# Patient Record
Sex: FEMALE | Race: WHITE | NOT HISPANIC OR LATINO | Employment: OTHER | URBAN - METROPOLITAN AREA
[De-identification: names, ages, dates, MRNs, and addresses within clinical notes are randomized per-mention and may not be internally consistent; named-entity substitution may affect disease eponyms.]

---

## 2018-02-26 ENCOUNTER — HISTORICAL (OUTPATIENT)
Dept: ADMINISTRATIVE | Facility: HOSPITAL | Age: 68
End: 2018-02-26

## 2018-04-02 ENCOUNTER — HISTORICAL (OUTPATIENT)
Dept: ADMINISTRATIVE | Facility: HOSPITAL | Age: 68
End: 2018-04-02

## 2022-04-29 NOTE — OP NOTE
Patient:   Michelle Rodríguez             MRN: [id number]            FIN: [id number]               Age:   67 years     Sex:  Female     :  1950   Associated Diagnoses:   None   Author:   Mildred Escalante MD      NAME: Michelle Rodríguez  SURGEON:  Mildred Escalante MD    YOB: 1950  DATE OF SURGERY: 2018    PREOPERATIVE DIAGNOSIS:  Cataract, left eye.    POSTOPERATIVE DIAGNOSIS:  Cataract,left eye.    PROCEDURE:  Cataract extraction with intraocular lens placement,left eye.    HISTORY:  The patient is a 67-year-old female, who presented to the clinic with a 2-3+ nuclear sclerotic cataract causing difficulty in patient's activity of daily living. After thorough discussion of risks, benefits, alternatives and complications, the patient expressed understanding and wished to proceed with cataract extraction.      PROCEDURE IN DETAIL:  On the day of surgery patient was brought to the preoperative holding area, where patient was given five drops each of phenylephrine, tropicamide and Cyclopentolate into the operative eye.  Patient was then brought to the operating room where patient was given Marcaine drops into the operative eye.  Patient was prepped and draped in normal sterile fashion.  A lid speculum was inserted.  Using operating microscope, 1.0-mm keratome was used to create a side port wound through which preservative free lidocaine was injected, followed by air, Vision Blue, BSS, then Viscoat.  A 2.65 mm keratome was used to create triplanar phacoemulsification wound, through which continuous tear capsulorrhexis was performed using Utrata forceps and a cystotome. Hydrodissection and delineation were performed until the lens was freely rotating in the capsular bag.  Phacoemulsification was carried out using divide and conquer fashion to remove epinucleus and nuclear fragments.  Attention was turned to cortex, which was removed using irrigation aspiration.  Healon was injected into the  capsular bag, which was found to be free of tears or defect.  A ZCBOO +20.0 diopter lens was inserted and carefully rotated into place.  Viscoelastic was removed from the eye.  Wounds were sealed using hydration.  Ofloxacin and  Maxitrol ointment were placed into the patient's eye-which was patched and shielded.  The patient tolerated the procedure well and will followup tomorrow in clinic.

## 2022-04-29 NOTE — OP NOTE
Patient:   Michelle Rodríguez             MRN: [id number]            FIN: [id number]               Age:   67 years     Sex:  Female     :  1950   Associated Diagnoses:   None   Author:   Mildred Escalante MD      NAME: Michelle Rodríguez  SURGEON:  Mildred Escalante MD    YOB: 1950  DATE OF SURGERY: 2018    PREOPERATIVE DIAGNOSIS:  Cataract, right eye.    POSTOPERATIVE DIAGNOSIS:  Cataract,right eye.    PROCEDURE:  Cataract extraction with intraocular lens placement,right eye.    HISTORY:  The patient is a 67-year-old female, who presented to the clinic with a 2+ nuclear sclerotic cataract causing difficulty in patient's activity of daily living. After thorough discussion of risks, benefits, alternatives and complications, the patient expressed understanding and wished to proceed with cataract extraction.      PROCEDURE IN DETAIL:  On the day of surgery patient was brought to the preoperative holding area, where patient was given five drops each of phenylephrine, tropicamide and Cyclopentolate into the operative eye.  Patient was then brought to the operating room where patient was given Marcaine drops into the operative eye.  Patient was prepped and draped in normal sterile fashion.  A lid speculum was inserted.  Using operating microscope, 1.0-mm keratome was used to create a side port wound through which preservative free lidocaine was injected, followed by air, Vision Blue, BSS, then Viscoat.  A 2.65 mm keratome was used to create triplanar phacoemulsification wound, through which continuous tear capsulorrhexis was performed using Utrata forceps and a cystotome. Hydrodissection and delineation were performed until the lens was freely rotating in the capsular bag.  Phacoemulsification was carried out using divide and conquer fashion to remove epinucleus and nuclear fragments.  Attention was turned to cortex, which was removed using irrigation aspiration.  Healon was injected into the  capsular bag, which was found to be free of tears or defect.  A ZCBOO +19.50 diopter lens was inserted and carefully rotated into place.  Viscoelastic was removed from the eye.  Wounds were sealed using hydration.  Moxifloxacin and  Maxitrol ointment were placed into the patient's eye-which was patched and shielded.  The patient tolerated the procedure well and will followup tomorrow in clinic.

## 2022-11-21 ENCOUNTER — HOSPITAL ENCOUNTER (OUTPATIENT)
Facility: HOSPITAL | Age: 72
Discharge: HOME | End: 2022-11-21
Attending: INTERNAL MEDICINE | Admitting: INTERNAL MEDICINE
Payer: MEDICARE

## 2022-11-21 VITALS
WEIGHT: 126.13 LBS | DIASTOLIC BLOOD PRESSURE: 54 MMHG | HEART RATE: 58 BPM | HEIGHT: 62 IN | TEMPERATURE: 98 F | BODY MASS INDEX: 23.21 KG/M2 | OXYGEN SATURATION: 98 % | SYSTOLIC BLOOD PRESSURE: 123 MMHG

## 2022-11-21 DIAGNOSIS — R94.8 NONSPECIFIC ABNORMAL RESULTS OF BASAL METABOLISM FUNCTION STUDY: ICD-10-CM

## 2022-11-21 DIAGNOSIS — I42.9 PRIMARY CARDIOMYOPATHY: ICD-10-CM

## 2022-11-21 LAB
CATH EF ESTIMATED: 35 %
POCT GLUCOSE: 109 MG/DL (ref 70–110)

## 2022-11-21 PROCEDURE — 63600175 PHARM REV CODE 636 W HCPCS: Performed by: INTERNAL MEDICINE

## 2022-11-21 PROCEDURE — 99152 MOD SED SAME PHYS/QHP 5/>YRS: CPT | Performed by: INTERNAL MEDICINE

## 2022-11-21 PROCEDURE — C1769 GUIDE WIRE: HCPCS | Performed by: INTERNAL MEDICINE

## 2022-11-21 PROCEDURE — 27201423 OPTIME MED/SURG SUP & DEVICES STERILE SUPPLY: Performed by: INTERNAL MEDICINE

## 2022-11-21 PROCEDURE — C1887 CATHETER, GUIDING: HCPCS | Performed by: INTERNAL MEDICINE

## 2022-11-21 PROCEDURE — 25500020 PHARM REV CODE 255: Performed by: INTERNAL MEDICINE

## 2022-11-21 PROCEDURE — C1894 INTRO/SHEATH, NON-LASER: HCPCS | Performed by: INTERNAL MEDICINE

## 2022-11-21 PROCEDURE — 93458 L HRT ARTERY/VENTRICLE ANGIO: CPT | Performed by: INTERNAL MEDICINE

## 2022-11-21 PROCEDURE — 25000003 PHARM REV CODE 250: Performed by: INTERNAL MEDICINE

## 2022-11-21 RX ORDER — SITAGLIPTIN 100 MG/1
100 TABLET, FILM COATED ORAL DAILY
Status: ON HOLD | COMMUNITY
Start: 2022-11-09 | End: 2023-03-23 | Stop reason: HOSPADM

## 2022-11-21 RX ORDER — DIAZEPAM 5 MG/1
10 TABLET ORAL
Status: DISCONTINUED | OUTPATIENT
Start: 2022-11-21 | End: 2022-11-21 | Stop reason: HOSPADM

## 2022-11-21 RX ORDER — SODIUM CHLORIDE 9 MG/ML
INJECTION, SOLUTION INTRAVENOUS CONTINUOUS
Status: DISCONTINUED | OUTPATIENT
Start: 2022-11-21 | End: 2022-11-21 | Stop reason: HOSPADM

## 2022-11-21 RX ORDER — VIT C/E/ZN/COPPR/LUTEIN/ZEAXAN 250MG-90MG
1000 CAPSULE ORAL DAILY
COMMUNITY

## 2022-11-21 RX ORDER — ASPIRIN 81 MG/1
81 TABLET ORAL DAILY
COMMUNITY

## 2022-11-21 RX ORDER — BISACODYL 5 MG/1
1 TABLET, COATED ORAL DAILY
COMMUNITY

## 2022-11-21 RX ORDER — ACETAMINOPHEN 500 MG
3000 TABLET ORAL DAILY
COMMUNITY

## 2022-11-21 RX ORDER — SODIUM CHLORIDE 0.9 % (FLUSH) 0.9 %
10 SYRINGE (ML) INJECTION
Status: DISCONTINUED | OUTPATIENT
Start: 2022-11-21 | End: 2022-11-21 | Stop reason: HOSPADM

## 2022-11-21 RX ORDER — SACUBITRIL AND VALSARTAN 49; 51 MG/1; MG/1
1 TABLET, FILM COATED ORAL 2 TIMES DAILY
COMMUNITY
Start: 2022-10-28

## 2022-11-21 RX ORDER — METFORMIN HYDROCHLORIDE 1000 MG/1
2000 TABLET ORAL
COMMUNITY
Start: 2022-10-02

## 2022-11-21 RX ORDER — FENTANYL CITRATE 50 UG/ML
INJECTION, SOLUTION INTRAMUSCULAR; INTRAVENOUS
Status: DISCONTINUED | OUTPATIENT
Start: 2022-11-21 | End: 2022-11-21 | Stop reason: HOSPADM

## 2022-11-21 RX ORDER — MIDAZOLAM HYDROCHLORIDE 1 MG/ML
INJECTION INTRAMUSCULAR; INTRAVENOUS
Status: DISCONTINUED | OUTPATIENT
Start: 2022-11-21 | End: 2022-11-21 | Stop reason: HOSPADM

## 2022-11-21 RX ORDER — ALENDRONATE SODIUM 70 MG/1
70 TABLET ORAL
COMMUNITY
Start: 2022-10-25

## 2022-11-21 RX ORDER — CARVEDILOL 25 MG/1
25 TABLET ORAL 2 TIMES DAILY
COMMUNITY
Start: 2022-11-02

## 2022-11-21 RX ORDER — GLIPIZIDE 10 MG/1
10 TABLET, FILM COATED, EXTENDED RELEASE ORAL DAILY
COMMUNITY
Start: 2022-11-15

## 2022-11-21 RX ORDER — HEPARIN SODIUM 1000 [USP'U]/ML
INJECTION, SOLUTION INTRAVENOUS; SUBCUTANEOUS
Status: DISCONTINUED | OUTPATIENT
Start: 2022-11-21 | End: 2022-11-21 | Stop reason: HOSPADM

## 2022-11-21 RX ORDER — ATORVASTATIN CALCIUM 10 MG/1
10 TABLET, FILM COATED ORAL
COMMUNITY
Start: 2022-11-20

## 2022-11-21 RX ORDER — DIPHENHYDRAMINE HCL 25 MG
50 CAPSULE ORAL
Status: DISCONTINUED | OUTPATIENT
Start: 2022-11-21 | End: 2022-11-21 | Stop reason: HOSPADM

## 2022-11-21 RX ORDER — ASCORBIC ACID 500 MG
1 TABLET,CHEWABLE ORAL DAILY
COMMUNITY

## 2022-11-21 RX ORDER — LIDOCAINE HYDROCHLORIDE 20 MG/ML
INJECTION, SOLUTION INFILTRATION; PERINEURAL
Status: DISCONTINUED | OUTPATIENT
Start: 2022-11-21 | End: 2022-11-21 | Stop reason: HOSPADM

## 2022-11-21 RX ORDER — NITROGLYCERIN 20 MG/100ML
INJECTION INTRAVENOUS
Status: DISCONTINUED | OUTPATIENT
Start: 2022-11-21 | End: 2022-11-21 | Stop reason: HOSPADM

## 2022-11-21 RX ORDER — INSULIN GLARGINE 100 [IU]/ML
40 INJECTION, SOLUTION SUBCUTANEOUS DAILY
COMMUNITY
Start: 2022-10-31

## 2022-11-21 RX ORDER — SODIUM CHLORIDE 9 MG/ML
INJECTION, SOLUTION INTRAVENOUS ONCE
Status: COMPLETED | OUTPATIENT
Start: 2022-11-21 | End: 2022-11-21

## 2022-11-21 RX ADMIN — DIPHENHYDRAMINE HYDROCHLORIDE 50 MG: 25 CAPSULE ORAL at 06:11

## 2022-11-21 RX ADMIN — SODIUM CHLORIDE: 9 INJECTION, SOLUTION INTRAVENOUS at 06:11

## 2022-11-21 RX ADMIN — DIAZEPAM 10 MG: 5 TABLET ORAL at 06:11

## 2022-11-21 NOTE — Clinical Note
The catheter was inserted into the, was removed from the and was inserted over the wire into the left ventricle.

## 2022-11-21 NOTE — Clinical Note
The catheter was inserted into the, was removed from the and was inserted over the wire into the ostium   right coronary artery. An angiography was performed of the right coronary arteries. Multiple views were taken.

## 2022-11-21 NOTE — Clinical Note
The DP pulses were 1+ bilaterally. The left radial pulse was 2+. The right radial pulse was +2 and allens test negative.

## 2022-11-21 NOTE — Clinical Note
The catheter was inserted into the, was removed from the and was inserted over the wire into the ostium   left main. An angiography was performed of the left coronary arteries. Multiple views were taken. The angiography was performed via power injection.

## 2022-12-04 NOTE — DISCHARGE SUMMARY
Procedure(s) (LRB):  CATHETERIZATION, HEART, LEFT (Left)    OUTCOME: Patient tolerated treatment/procedure well without complication and is now ready for discharge.    DIAGNOSIS: {Diagnosis :cmo     DISPOSITION: Home or Self Care    FOLLOWUP: In clinic    DISCHARGE INSTRUCTIONS:   Discharge Procedure Orders   Diet general       TIME SPENT ON DISCHARGE:   31 minutes

## 2023-03-23 ENCOUNTER — HOSPITAL ENCOUNTER (OUTPATIENT)
Facility: HOSPITAL | Age: 73
Discharge: HOME | End: 2023-03-23
Attending: INTERNAL MEDICINE | Admitting: INTERNAL MEDICINE
Payer: MEDICARE

## 2023-03-23 VITALS
TEMPERATURE: 98 F | HEART RATE: 71 BPM | DIASTOLIC BLOOD PRESSURE: 70 MMHG | SYSTOLIC BLOOD PRESSURE: 138 MMHG | BODY MASS INDEX: 23.98 KG/M2 | HEIGHT: 62 IN | OXYGEN SATURATION: 95 % | WEIGHT: 130.31 LBS | RESPIRATION RATE: 7 BRPM

## 2023-03-23 DIAGNOSIS — Z01.810 PREOP CARDIOVASCULAR EXAM: ICD-10-CM

## 2023-03-23 DIAGNOSIS — I49.9 ARRHYTHMIA: ICD-10-CM

## 2023-03-23 DIAGNOSIS — I50.42 CHRONIC COMBINED SYSTOLIC AND DIASTOLIC HEART FAILURE: ICD-10-CM

## 2023-03-23 LAB — POCT GLUCOSE: 107 MG/DL (ref 70–110)

## 2023-03-23 PROCEDURE — 33225 L VENTRIC PACING LEAD ADD-ON: CPT | Performed by: INTERNAL MEDICINE

## 2023-03-23 PROCEDURE — C1898 LEAD, PMKR, OTHER THAN TRANS: HCPCS | Performed by: INTERNAL MEDICINE

## 2023-03-23 PROCEDURE — 25000003 PHARM REV CODE 250: Performed by: INTERNAL MEDICINE

## 2023-03-23 PROCEDURE — 93010 EKG 12-LEAD: ICD-10-PCS | Mod: ,,, | Performed by: INTERNAL MEDICINE

## 2023-03-23 PROCEDURE — C1882 AICD, OTHER THAN SING/DUAL: HCPCS | Performed by: INTERNAL MEDICINE

## 2023-03-23 PROCEDURE — 93005 ELECTROCARDIOGRAM TRACING: CPT | Mod: 59

## 2023-03-23 PROCEDURE — 93010 ELECTROCARDIOGRAM REPORT: CPT | Mod: ,,, | Performed by: INTERNAL MEDICINE

## 2023-03-23 PROCEDURE — 33249 INSJ/RPLCMT DEFIB W/LEAD(S): CPT | Performed by: INTERNAL MEDICINE

## 2023-03-23 PROCEDURE — 99153 MOD SED SAME PHYS/QHP EA: CPT | Performed by: INTERNAL MEDICINE

## 2023-03-23 PROCEDURE — C1887 CATHETER, GUIDING: HCPCS | Performed by: INTERNAL MEDICINE

## 2023-03-23 PROCEDURE — C1900 LEAD, CORONARY VENOUS: HCPCS | Performed by: INTERNAL MEDICINE

## 2023-03-23 PROCEDURE — 63600175 PHARM REV CODE 636 W HCPCS: Performed by: INTERNAL MEDICINE

## 2023-03-23 PROCEDURE — 25500020 PHARM REV CODE 255: Performed by: INTERNAL MEDICINE

## 2023-03-23 PROCEDURE — C1777 LEAD, AICD, ENDO SINGLE COIL: HCPCS | Performed by: INTERNAL MEDICINE

## 2023-03-23 PROCEDURE — 99152 MOD SED SAME PHYS/QHP 5/>YRS: CPT | Performed by: INTERNAL MEDICINE

## 2023-03-23 PROCEDURE — C1769 GUIDE WIRE: HCPCS | Performed by: INTERNAL MEDICINE

## 2023-03-23 PROCEDURE — C1893 INTRO/SHEATH, FIXED,NON-PEEL: HCPCS | Performed by: INTERNAL MEDICINE

## 2023-03-23 DEVICE — LEFT HEART LEAD
Type: IMPLANTABLE DEVICE | Site: SUBCLAVIAN | Status: FUNCTIONAL
Brand: QUARTET™

## 2023-03-23 DEVICE — CARDIAC RESYNCHRONISATION THERAPY DEFIBRILLATOR
Type: IMPLANTABLE DEVICE | Site: SUBCLAVIAN | Status: FUNCTIONAL
Brand: GALLANT™

## 2023-03-23 DEVICE — DEFIBRILLATION LEAD
Type: IMPLANTABLE DEVICE | Site: SUBCLAVIAN | Status: FUNCTIONAL
Brand: DURATA™

## 2023-03-23 DEVICE — PACING LEAD
Type: IMPLANTABLE DEVICE | Site: SUBCLAVIAN | Status: FUNCTIONAL
Brand: TENDRIL™

## 2023-03-23 RX ORDER — GLUCAGON 3 MG/1
3 POWDER NASAL
COMMUNITY

## 2023-03-23 RX ORDER — LANOLIN ALCOHOL/MO/W.PET/CERES
400 CREAM (GRAM) TOPICAL DAILY
COMMUNITY

## 2023-03-23 RX ORDER — MORPHINE SULFATE 4 MG/ML
2 INJECTION, SOLUTION INTRAMUSCULAR; INTRAVENOUS EVERY 4 HOURS PRN
Status: DISCONTINUED | OUTPATIENT
Start: 2023-03-23 | End: 2023-03-23 | Stop reason: HOSPADM

## 2023-03-23 RX ORDER — DULAGLUTIDE 0.75 MG/.5ML
0.75 INJECTION, SOLUTION SUBCUTANEOUS
COMMUNITY

## 2023-03-23 RX ORDER — ACETAMINOPHEN 325 MG/1
650 TABLET ORAL EVERY 4 HOURS PRN
Status: DISCONTINUED | OUTPATIENT
Start: 2023-03-23 | End: 2023-03-23 | Stop reason: HOSPADM

## 2023-03-23 RX ORDER — VANCOMYCIN HYDROCHLORIDE 1 G/20ML
INJECTION, POWDER, LYOPHILIZED, FOR SOLUTION INTRAVENOUS
Status: DISCONTINUED | OUTPATIENT
Start: 2023-03-23 | End: 2023-03-23 | Stop reason: HOSPADM

## 2023-03-23 RX ORDER — HYDROCODONE BITARTRATE AND ACETAMINOPHEN 5; 325 MG/1; MG/1
1 TABLET ORAL EVERY 4 HOURS PRN
Status: DISCONTINUED | OUTPATIENT
Start: 2023-03-23 | End: 2023-03-23 | Stop reason: HOSPADM

## 2023-03-23 RX ORDER — SODIUM CHLORIDE 0.9 % (FLUSH) 0.9 %
10 SYRINGE (ML) INJECTION
Status: ACTIVE | OUTPATIENT
Start: 2023-03-23

## 2023-03-23 RX ORDER — LIDOCAINE HYDROCHLORIDE 10 MG/ML
INJECTION INFILTRATION; PERINEURAL
Status: DISCONTINUED | OUTPATIENT
Start: 2023-03-23 | End: 2023-03-23 | Stop reason: HOSPADM

## 2023-03-23 RX ORDER — FENTANYL CITRATE 50 UG/ML
INJECTION, SOLUTION INTRAMUSCULAR; INTRAVENOUS
Status: DISCONTINUED | OUTPATIENT
Start: 2023-03-23 | End: 2023-03-23 | Stop reason: HOSPADM

## 2023-03-23 RX ORDER — MIDAZOLAM HYDROCHLORIDE 1 MG/ML
INJECTION INTRAMUSCULAR; INTRAVENOUS
Status: DISCONTINUED | OUTPATIENT
Start: 2023-03-23 | End: 2023-03-23 | Stop reason: HOSPADM

## 2023-03-23 RX ORDER — CEFAZOLIN SODIUM 1 G/3ML
2 INJECTION, POWDER, FOR SOLUTION INTRAMUSCULAR; INTRAVENOUS
Status: DISPENSED | OUTPATIENT
Start: 2023-03-23

## 2023-03-23 RX ADMIN — HYDROCODONE BITARTRATE AND ACETAMINOPHEN 1 TABLET: 5; 325 TABLET ORAL at 03:03

## 2023-03-23 NOTE — Clinical Note
A venogram was performed in the coronary sinus. The vessel was injected via hand injection  with 10 mL of contrast.

## 2023-03-23 NOTE — DISCHARGE INSTRUCTIONS
Keep your bandage on the site for 24 hours. After that time, it is OK to take it off. You can cover the site with a bandage if you like.  You may shower or take a bath after your dressing is off. Do not scrub the site. Wash gently around it and let the water flow over the site. Pat dry gently. Do not soak in water or swim until your wound is healed.  Always wash your hands before and after touching the wound.  Avoid picking the scab or scratching the site which may cause bleeding. Do not rub the wound or take off any small strips of tape.  Watch for bleeding at the puncture site. If you notice more than a few drops of blood:  Stop what you are doing and sit or lie down.  Put firm pressure on the site with your fingers or with a clean gauze for 10 minutes.  Do not lift up your fingers to check for bleeding or swelling until after 10 minutes.  If the bleeding stops, sit or lie quietly for a few hours, taking care not to move or bend your arm or leg where the bleeding was.  If the bleeding does not stop, or if there is a lot of blood or spurting blood, call for emergency help right away. Do not drive yourself to get help.

## 2023-03-23 NOTE — Clinical Note
The lead was inserted under fluorscopic guidance, thresholds were tested, was connected to generator, was sutured in place and was secured in place. The lead was inserted in the right atrium.

## 2023-03-23 NOTE — Clinical Note
The lead was inserted under fluorscopic guidance, thresholds were tested, was connected to generator, was sutured in place and was secured in place. The lead was inserted in the left ventricle.

## 2023-03-23 NOTE — DISCHARGE SUMMARY
[facility]  Discharge Note  Short Stay    Procedure(s) (LRB):  INSERTION, ICD, BIVENTRICULAR (N/A)      OUTCOME: Patient tolerated treatment/procedure well without complication and is now ready for discharge.    DISPOSITION: Home or Self Care    FINAL DIAGNOSIS:  Chronic combined systolic and diastolic heart failure    FOLLOWUP: In clinic    DISCHARGE INSTRUCTIONS:  No discharge procedures on file.     TIME SPENT ON DISCHARGE: 15 minutes

## 2023-03-23 NOTE — Clinical Note
The lead was inserted under fluorscopic guidance, thresholds were tested, was connected to generator and was sutured in place. A new lead was attached to the coronary sinus.

## 2023-04-03 ENCOUNTER — ANESTHESIA (OUTPATIENT)
Dept: SURGERY | Facility: HOSPITAL | Age: 73
DRG: 167 | End: 2023-04-03
Payer: MEDICARE

## 2023-04-03 ENCOUNTER — HOSPITAL ENCOUNTER (INPATIENT)
Facility: HOSPITAL | Age: 73
LOS: 5 days | Discharge: HOME | DRG: 167 | End: 2023-04-08
Attending: STUDENT IN AN ORGANIZED HEALTH CARE EDUCATION/TRAINING PROGRAM | Admitting: STUDENT IN AN ORGANIZED HEALTH CARE EDUCATION/TRAINING PROGRAM
Payer: MEDICARE

## 2023-04-03 ENCOUNTER — ANESTHESIA EVENT (OUTPATIENT)
Dept: SURGERY | Facility: HOSPITAL | Age: 73
DRG: 167 | End: 2023-04-03
Payer: MEDICARE

## 2023-04-03 DIAGNOSIS — I42.8 NON-ISCHEMIC CARDIOMYOPATHY: ICD-10-CM

## 2023-04-03 DIAGNOSIS — I95.9 HYPOTENSION, UNSPECIFIED HYPOTENSION TYPE: ICD-10-CM

## 2023-04-03 DIAGNOSIS — R07.9 CHEST PAIN: ICD-10-CM

## 2023-04-03 DIAGNOSIS — E86.0 DEHYDRATION: ICD-10-CM

## 2023-04-03 DIAGNOSIS — R11.2 NAUSEA AND VOMITING, UNSPECIFIED VOMITING TYPE: ICD-10-CM

## 2023-04-03 DIAGNOSIS — J94.2 HEMOTHORAX ON LEFT: Primary | ICD-10-CM

## 2023-04-03 LAB
ABO + RH BLD: NORMAL
ABO + RH BLD: NORMAL
ABORH RETYPE: NORMAL
ALBUMIN SERPL-MCNC: 2.9 G/DL (ref 3.4–4.8)
ALBUMIN/GLOB SERPL: 1.5 RATIO (ref 1.1–2)
ALP SERPL-CCNC: 25 UNIT/L (ref 40–150)
ALT SERPL-CCNC: 19 UNIT/L (ref 0–55)
AST SERPL-CCNC: 17 UNIT/L (ref 5–34)
BASOPHILS # BLD AUTO: 0.02 X10(3)/MCL (ref 0–0.2)
BASOPHILS # BLD AUTO: 0.02 X10(3)/MCL (ref 0–0.2)
BASOPHILS NFR BLD AUTO: 0.2 %
BASOPHILS NFR BLD AUTO: 0.3 %
BILIRUBIN DIRECT+TOT PNL SERPL-MCNC: 0.5 MG/DL
BLD PROD TYP BPU: NORMAL
BLD PROD TYP BPU: NORMAL
BLOOD UNIT EXPIRATION DATE: NORMAL
BLOOD UNIT EXPIRATION DATE: NORMAL
BLOOD UNIT TYPE CODE: 600
BLOOD UNIT TYPE CODE: 600
BNP BLD-MCNC: 141.3 PG/ML
BSA FOR ECHO PROCEDURE: 1.58 M2
BUN SERPL-MCNC: 12.8 MG/DL (ref 9.8–20.1)
CALCIUM SERPL-MCNC: 8.5 MG/DL (ref 8.4–10.2)
CHLORIDE SERPL-SCNC: 108 MMOL/L (ref 98–107)
CO2 SERPL-SCNC: 23 MMOL/L (ref 23–31)
CORRECTED TEMPERATURE (PCO2): 41 MMHG (ref 35–45)
CORRECTED TEMPERATURE (PH): 7.41 (ref 7.35–7.45)
CORRECTED TEMPERATURE (PO2): 58 MMHG (ref 80–100)
CREAT SERPL-MCNC: 0.78 MG/DL (ref 0.55–1.02)
CROSSMATCH INTERPRETATION: NORMAL
CROSSMATCH INTERPRETATION: NORMAL
DISPENSE STATUS: NORMAL
DISPENSE STATUS: NORMAL
DOP CALC MV VTI: 15.1 CM
E WAVE DECELERATION TIME: 174 MSEC
E/A RATIO: 0.75
EJECTION FRACTION: 50 %
EOSINOPHIL # BLD AUTO: 0 X10(3)/MCL (ref 0–0.9)
EOSINOPHIL # BLD AUTO: 0.09 X10(3)/MCL (ref 0–0.9)
EOSINOPHIL NFR BLD AUTO: 0 %
EOSINOPHIL NFR BLD AUTO: 1.4 %
ERYTHROCYTE [DISTWIDTH] IN BLOOD BY AUTOMATED COUNT: 13 % (ref 11.5–17)
ERYTHROCYTE [DISTWIDTH] IN BLOOD BY AUTOMATED COUNT: 16.1 % (ref 11.5–17)
FLUAV AG UPPER RESP QL IA.RAPID: NOT DETECTED
FLUBV AG UPPER RESP QL IA.RAPID: NOT DETECTED
GFR SERPLBLD CREATININE-BSD FMLA CKD-EPI: >60 MLS/MIN/1.73/M2
GLOBULIN SER-MCNC: 1.9 GM/DL (ref 2.4–3.5)
GLUCOSE SERPL-MCNC: 397 MG/DL (ref 82–115)
GROUP & RH: NORMAL
HCO3 UR-SCNC: 26 MMOL/L (ref 22–26)
HCT VFR BLD AUTO: 21.3 % (ref 37–47)
HCT VFR BLD AUTO: 25.7 % (ref 37–47)
HGB BLD-MCNC: 7.7 G/DL (ref 12–16)
HGB BLD-MCNC: 8.8 G/DL (ref 12–16)
HGB BLD-MCNC: 9.7 G/DL (ref 12–16)
IMM GRANULOCYTES # BLD AUTO: 0.03 X10(3)/MCL (ref 0–0.04)
IMM GRANULOCYTES # BLD AUTO: 0.04 X10(3)/MCL (ref 0–0.04)
IMM GRANULOCYTES NFR BLD AUTO: 0.4 %
IMM GRANULOCYTES NFR BLD AUTO: 0.5 %
INDIRECT COOMBS GEL: NORMAL
INR BLD: 1.2 (ref 0–1.3)
LIPASE SERPL-CCNC: 17 U/L
LYMPHOCYTES # BLD AUTO: 1.03 X10(3)/MCL (ref 0.6–4.6)
LYMPHOCYTES # BLD AUTO: 1.36 X10(3)/MCL (ref 0.6–4.6)
LYMPHOCYTES NFR BLD AUTO: 10.7 %
LYMPHOCYTES NFR BLD AUTO: 21.5 %
MCH RBC QN AUTO: 32.2 PG (ref 27–31)
MCH RBC QN AUTO: 32.7 PG (ref 27–31)
MCHC RBC AUTO-ENTMCNC: 34.2 G/DL (ref 33–36)
MCHC RBC AUTO-ENTMCNC: 36.2 G/DL (ref 33–36)
MCV RBC AUTO: 89.1 FL (ref 80–94)
MCV RBC AUTO: 95.5 FL (ref 80–94)
MONOCYTES # BLD AUTO: 0.44 X10(3)/MCL (ref 0.1–1.3)
MONOCYTES # BLD AUTO: 0.48 X10(3)/MCL (ref 0.1–1.3)
MONOCYTES NFR BLD AUTO: 4.6 %
MONOCYTES NFR BLD AUTO: 7.6 %
MV MEAN GRADIENT: 2 MMHG
MV PEAK A VEL: 1 M/S
MV PEAK E VEL: 0.75 M/S
MV PEAK GRADIENT: 4 MMHG
NEUTROPHILS # BLD AUTO: 4.34 X10(3)/MCL (ref 2.1–9.2)
NEUTROPHILS # BLD AUTO: 8.09 X10(3)/MCL (ref 2.1–9.2)
NEUTROPHILS NFR BLD AUTO: 68.7 %
NEUTROPHILS NFR BLD AUTO: 84.1 %
NRBC BLD AUTO-RTO: 0 %
NRBC BLD AUTO-RTO: 0 %
PCO2 BLDA: 41 MMHG (ref 35–45)
PH SMN: 7.41 [PH] (ref 7.35–7.45)
PISA TR MAX VEL: 3.24 M/S
PLATELET # BLD AUTO: 114 X10(3)/MCL (ref 130–400)
PLATELET # BLD AUTO: 141 X10(3)/MCL (ref 130–400)
PMV BLD AUTO: 11.1 FL (ref 7.4–10.4)
PMV BLD AUTO: 11.3 FL (ref 7.4–10.4)
PO2 BLDA: 58 MMHG (ref 80–100)
POC BASE DEFICIT: 1.2 MMOL/L (ref -2–2)
POC COHB: 3 %
POC IONIZED CALCIUM: 1.17 MMOL/L (ref 1.12–1.23)
POC METHB: 0.6 % (ref 0.4–1.5)
POC O2HB: 90.2 % (ref 94–97)
POC SATURATED O2: 90 %
POC TEMPERATURE: 37 °C
POCT GLUCOSE: 315 MG/DL (ref 70–110)
POCT GLUCOSE: 383 MG/DL (ref 70–110)
POTASSIUM BLD-SCNC: 4.1 MMOL/L (ref 3.5–5)
POTASSIUM SERPL-SCNC: 5 MMOL/L (ref 3.5–5.1)
PROT SERPL-MCNC: 4.8 GM/DL (ref 5.8–7.6)
PROTHROMBIN TIME: 15 SECONDS (ref 12.5–14.5)
RBC # BLD AUTO: 2.39 X10(6)/MCL (ref 4.2–5.4)
RBC # BLD AUTO: 2.69 X10(6)/MCL (ref 4.2–5.4)
SARS-COV-2 RNA RESP QL NAA+PROBE: NOT DETECTED
SODIUM BLD-SCNC: 133 MMOL/L (ref 137–145)
SODIUM SERPL-SCNC: 136 MMOL/L (ref 136–145)
SPECIMEN OUTDATE: NORMAL
SPECIMEN SOURCE: ABNORMAL
TR MAX PG: 42 MMHG
TRICUSPID ANNULAR PLANE SYSTOLIC EXCURSION: 1.87 CM
TROPONIN I SERPL-MCNC: 0.03 NG/ML (ref 0–0.04)
UNIT NUMBER: NORMAL
UNIT NUMBER: NORMAL
WBC # SPEC AUTO: 6.3 X10(3)/MCL (ref 4.5–11.5)
WBC # SPEC AUTO: 9.6 X10(3)/MCL (ref 4.5–11.5)

## 2023-04-03 PROCEDURE — 93010 EKG 12-LEAD: ICD-10-PCS | Mod: ,,, | Performed by: INTERNAL MEDICINE

## 2023-04-03 PROCEDURE — 11000001 HC ACUTE MED/SURG PRIVATE ROOM

## 2023-04-03 PROCEDURE — 0240U COVID/FLU A&B PCR: CPT | Performed by: STUDENT IN AN ORGANIZED HEALTH CARE EDUCATION/TRAINING PROGRAM

## 2023-04-03 PROCEDURE — 27201423 OPTIME MED/SURG SUP & DEVICES STERILE SUPPLY: Performed by: SPECIALIST

## 2023-04-03 PROCEDURE — 63600175 PHARM REV CODE 636 W HCPCS: Performed by: ANESTHESIOLOGY

## 2023-04-03 PROCEDURE — 37000009 HC ANESTHESIA EA ADD 15 MINS: Performed by: SPECIALIST

## 2023-04-03 PROCEDURE — 82962 GLUCOSE BLOOD TEST: CPT

## 2023-04-03 PROCEDURE — 83880 ASSAY OF NATRIURETIC PEPTIDE: CPT | Performed by: EMERGENCY MEDICINE

## 2023-04-03 PROCEDURE — 84484 ASSAY OF TROPONIN QUANT: CPT | Performed by: EMERGENCY MEDICINE

## 2023-04-03 PROCEDURE — 25000003 PHARM REV CODE 250: Performed by: NURSE ANESTHETIST, CERTIFIED REGISTERED

## 2023-04-03 PROCEDURE — 25500020 PHARM REV CODE 255: Performed by: STUDENT IN AN ORGANIZED HEALTH CARE EDUCATION/TRAINING PROGRAM

## 2023-04-03 PROCEDURE — 25000003 PHARM REV CODE 250: Performed by: STUDENT IN AN ORGANIZED HEALTH CARE EDUCATION/TRAINING PROGRAM

## 2023-04-03 PROCEDURE — C1729 CATH, DRAINAGE: HCPCS | Performed by: SPECIALIST

## 2023-04-03 PROCEDURE — 86923 COMPATIBILITY TEST ELECTRIC: CPT | Mod: 91 | Performed by: STUDENT IN AN ORGANIZED HEALTH CARE EDUCATION/TRAINING PROGRAM

## 2023-04-03 PROCEDURE — 36000711: Performed by: SPECIALIST

## 2023-04-03 PROCEDURE — 63600175 PHARM REV CODE 636 W HCPCS

## 2023-04-03 PROCEDURE — 86923 COMPATIBILITY TEST ELECTRIC: CPT | Performed by: STUDENT IN AN ORGANIZED HEALTH CARE EDUCATION/TRAINING PROGRAM

## 2023-04-03 PROCEDURE — 27000221 HC OXYGEN, UP TO 24 HOURS

## 2023-04-03 PROCEDURE — 82803 BLOOD GASES ANY COMBINATION: CPT

## 2023-04-03 PROCEDURE — 99900035 HC TECH TIME PER 15 MIN (STAT)

## 2023-04-03 PROCEDURE — D9220A PRA ANESTHESIA: ICD-10-PCS | Mod: ANES,,, | Performed by: ANESTHESIOLOGY

## 2023-04-03 PROCEDURE — 93005 ELECTROCARDIOGRAM TRACING: CPT

## 2023-04-03 PROCEDURE — 36000710: Performed by: SPECIALIST

## 2023-04-03 PROCEDURE — 36620 INSERTION CATHETER ARTERY: CPT

## 2023-04-03 PROCEDURE — 99291 CRITICAL CARE FIRST HOUR: CPT

## 2023-04-03 PROCEDURE — 25000003 PHARM REV CODE 250

## 2023-04-03 PROCEDURE — 63600175 PHARM REV CODE 636 W HCPCS: Performed by: STUDENT IN AN ORGANIZED HEALTH CARE EDUCATION/TRAINING PROGRAM

## 2023-04-03 PROCEDURE — 36620 ARTERIAL: ICD-10-PCS | Mod: 59,,, | Performed by: ANESTHESIOLOGY

## 2023-04-03 PROCEDURE — 85025 COMPLETE CBC W/AUTO DIFF WBC: CPT | Performed by: SPECIALIST

## 2023-04-03 PROCEDURE — D9220A PRA ANESTHESIA: Mod: CRNA,,, | Performed by: NURSE ANESTHETIST, CERTIFIED REGISTERED

## 2023-04-03 PROCEDURE — 25000003 PHARM REV CODE 250: Performed by: SPECIALIST

## 2023-04-03 PROCEDURE — 87040 BLOOD CULTURE FOR BACTERIA: CPT | Performed by: STUDENT IN AN ORGANIZED HEALTH CARE EDUCATION/TRAINING PROGRAM

## 2023-04-03 PROCEDURE — D9220A PRA ANESTHESIA: Mod: ANES,,, | Performed by: ANESTHESIOLOGY

## 2023-04-03 PROCEDURE — P9016 RBC LEUKOCYTES REDUCED: HCPCS | Performed by: STUDENT IN AN ORGANIZED HEALTH CARE EDUCATION/TRAINING PROGRAM

## 2023-04-03 PROCEDURE — 80053 COMPREHEN METABOLIC PANEL: CPT | Performed by: EMERGENCY MEDICINE

## 2023-04-03 PROCEDURE — 37000008 HC ANESTHESIA 1ST 15 MINUTES: Performed by: SPECIALIST

## 2023-04-03 PROCEDURE — 85025 COMPLETE CBC W/AUTO DIFF WBC: CPT | Performed by: EMERGENCY MEDICINE

## 2023-04-03 PROCEDURE — 93010 ELECTROCARDIOGRAM REPORT: CPT | Mod: ,,, | Performed by: INTERNAL MEDICINE

## 2023-04-03 PROCEDURE — D9220A PRA ANESTHESIA: ICD-10-PCS | Mod: CRNA,,, | Performed by: NURSE ANESTHETIST, CERTIFIED REGISTERED

## 2023-04-03 PROCEDURE — 86900 BLOOD TYPING SEROLOGIC ABO: CPT | Performed by: EMERGENCY MEDICINE

## 2023-04-03 PROCEDURE — 21400001 HC TELEMETRY ROOM

## 2023-04-03 PROCEDURE — 96367 TX/PROPH/DG ADDL SEQ IV INF: CPT

## 2023-04-03 PROCEDURE — 32601 PR THORACOSCOPY,DX NO BX: ICD-10-PCS | Mod: LT,,, | Performed by: SPECIALIST

## 2023-04-03 PROCEDURE — 96365 THER/PROPH/DIAG IV INF INIT: CPT

## 2023-04-03 PROCEDURE — 32601 THORACOSCOPY DIAGNOSTIC: CPT | Mod: LT,,, | Performed by: SPECIALIST

## 2023-04-03 PROCEDURE — 36430 TRANSFUSION BLD/BLD COMPNT: CPT

## 2023-04-03 PROCEDURE — 85610 PROTHROMBIN TIME: CPT | Performed by: EMERGENCY MEDICINE

## 2023-04-03 PROCEDURE — 71000033 HC RECOVERY, INTIAL HOUR: Performed by: SPECIALIST

## 2023-04-03 PROCEDURE — 83690 ASSAY OF LIPASE: CPT | Performed by: STUDENT IN AN ORGANIZED HEALTH CARE EDUCATION/TRAINING PROGRAM

## 2023-04-03 PROCEDURE — 36620 INSERTION CATHETER ARTERY: CPT | Mod: 59,,, | Performed by: ANESTHESIOLOGY

## 2023-04-03 PROCEDURE — 63600175 PHARM REV CODE 636 W HCPCS: Performed by: SPECIALIST

## 2023-04-03 RX ORDER — ALBUMIN HUMAN 250 G/1000ML
SOLUTION INTRAVENOUS CONTINUOUS PRN
Status: DISCONTINUED | OUTPATIENT
Start: 2023-04-03 | End: 2023-04-03

## 2023-04-03 RX ORDER — METOCLOPRAMIDE HYDROCHLORIDE 5 MG/ML
5 INJECTION INTRAMUSCULAR; INTRAVENOUS EVERY 6 HOURS PRN
Status: DISCONTINUED | OUTPATIENT
Start: 2023-04-03 | End: 2023-04-08 | Stop reason: HOSPADM

## 2023-04-03 RX ORDER — GLYCOPYRROLATE 0.2 MG/ML
INJECTION INTRAMUSCULAR; INTRAVENOUS
Status: DISCONTINUED | OUTPATIENT
Start: 2023-04-03 | End: 2023-04-03

## 2023-04-03 RX ORDER — CALCIUM CHLORIDE INJECTION 100 MG/ML
INJECTION, SOLUTION INTRAVENOUS
Status: DISCONTINUED | OUTPATIENT
Start: 2023-04-03 | End: 2023-04-03

## 2023-04-03 RX ORDER — FAMOTIDINE 20 MG/1
20 TABLET, FILM COATED ORAL 2 TIMES DAILY
Status: DISCONTINUED | OUTPATIENT
Start: 2023-04-03 | End: 2023-04-08 | Stop reason: HOSPADM

## 2023-04-03 RX ORDER — ENOXAPARIN SODIUM 100 MG/ML
40 INJECTION SUBCUTANEOUS EVERY 24 HOURS
Status: DISCONTINUED | OUTPATIENT
Start: 2023-04-03 | End: 2023-04-08 | Stop reason: HOSPADM

## 2023-04-03 RX ORDER — LOPERAMIDE HYDROCHLORIDE 2 MG/1
4 CAPSULE ORAL ONCE
Status: DISCONTINUED | OUTPATIENT
Start: 2023-04-03 | End: 2023-04-08 | Stop reason: HOSPADM

## 2023-04-03 RX ORDER — SODIUM CHLORIDE 0.9 % (FLUSH) 0.9 %
10 SYRINGE (ML) INJECTION
Status: DISCONTINUED | OUTPATIENT
Start: 2023-04-03 | End: 2023-04-03 | Stop reason: HOSPADM

## 2023-04-03 RX ORDER — GLUCAGON 1 MG
1 KIT INJECTION
Status: DISCONTINUED | OUTPATIENT
Start: 2023-04-03 | End: 2023-04-08 | Stop reason: HOSPADM

## 2023-04-03 RX ORDER — ATORVASTATIN CALCIUM 10 MG/1
10 TABLET, FILM COATED ORAL
Status: DISCONTINUED | OUTPATIENT
Start: 2023-04-03 | End: 2023-04-08 | Stop reason: HOSPADM

## 2023-04-03 RX ORDER — ACETAMINOPHEN 500 MG
1000 TABLET ORAL
Status: COMPLETED | OUTPATIENT
Start: 2023-04-03 | End: 2023-04-03

## 2023-04-03 RX ORDER — HYDROMORPHONE HYDROCHLORIDE 2 MG/ML
0.2 INJECTION, SOLUTION INTRAMUSCULAR; INTRAVENOUS; SUBCUTANEOUS EVERY 5 MIN PRN
Status: DISCONTINUED | OUTPATIENT
Start: 2023-04-03 | End: 2023-04-03 | Stop reason: HOSPADM

## 2023-04-03 RX ORDER — ROCURONIUM BROMIDE 10 MG/ML
INJECTION, SOLUTION INTRAVENOUS
Status: DISCONTINUED | OUTPATIENT
Start: 2023-04-03 | End: 2023-04-03

## 2023-04-03 RX ORDER — ONDANSETRON HYDROCHLORIDE 2 MG/ML
INJECTION, SOLUTION INTRAMUSCULAR; INTRAVENOUS
Status: DISCONTINUED | OUTPATIENT
Start: 2023-04-03 | End: 2023-04-03

## 2023-04-03 RX ORDER — FENTANYL CITRATE 50 UG/ML
INJECTION, SOLUTION INTRAMUSCULAR; INTRAVENOUS
Status: DISCONTINUED | OUTPATIENT
Start: 2023-04-03 | End: 2023-04-03

## 2023-04-03 RX ORDER — HYDROCODONE BITARTRATE AND ACETAMINOPHEN 500; 5 MG/1; MG/1
TABLET ORAL
Status: DISCONTINUED | OUTPATIENT
Start: 2023-04-03 | End: 2023-04-08 | Stop reason: HOSPADM

## 2023-04-03 RX ORDER — PROPOFOL 10 MG/ML
VIAL (ML) INTRAVENOUS
Status: DISCONTINUED | OUTPATIENT
Start: 2023-04-03 | End: 2023-04-03

## 2023-04-03 RX ORDER — MUPIROCIN 20 MG/G
1 OINTMENT TOPICAL 2 TIMES DAILY
Status: DISPENSED | OUTPATIENT
Start: 2023-04-03 | End: 2023-04-05

## 2023-04-03 RX ORDER — CEFAZOLIN SODIUM 2 G/50ML
2 SOLUTION INTRAVENOUS
Status: CANCELLED | OUTPATIENT
Start: 2023-04-03

## 2023-04-03 RX ORDER — IBUPROFEN 200 MG
16 TABLET ORAL
Status: DISCONTINUED | OUTPATIENT
Start: 2023-04-03 | End: 2023-04-08 | Stop reason: HOSPADM

## 2023-04-03 RX ORDER — MUPIROCIN 20 MG/G
OINTMENT TOPICAL
Status: CANCELLED | OUTPATIENT
Start: 2023-04-03

## 2023-04-03 RX ORDER — ACETAMINOPHEN 325 MG/1
650 TABLET ORAL EVERY 4 HOURS PRN
Status: DISCONTINUED | OUTPATIENT
Start: 2023-04-03 | End: 2023-04-08 | Stop reason: HOSPADM

## 2023-04-03 RX ORDER — OXYCODONE HYDROCHLORIDE 5 MG/1
5 TABLET ORAL EVERY 4 HOURS PRN
Status: DISCONTINUED | OUTPATIENT
Start: 2023-04-03 | End: 2023-04-08 | Stop reason: HOSPADM

## 2023-04-03 RX ORDER — LIDOCAINE HYDROCHLORIDE 20 MG/ML
INJECTION, SOLUTION EPIDURAL; INFILTRATION; INTRACAUDAL; PERINEURAL
Status: DISCONTINUED | OUTPATIENT
Start: 2023-04-03 | End: 2023-04-03

## 2023-04-03 RX ORDER — TALC
6 POWDER (GRAM) TOPICAL NIGHTLY PRN
Status: DISCONTINUED | OUTPATIENT
Start: 2023-04-03 | End: 2023-04-03

## 2023-04-03 RX ORDER — VASOPRESSIN 20 [USP'U]/ML
INJECTION, SOLUTION INTRAMUSCULAR; SUBCUTANEOUS
Status: DISCONTINUED | OUTPATIENT
Start: 2023-04-03 | End: 2023-04-03

## 2023-04-03 RX ORDER — NALOXONE HCL 0.4 MG/ML
VIAL (ML) INJECTION
Status: COMPLETED
Start: 2023-04-03 | End: 2023-04-03

## 2023-04-03 RX ORDER — KETOROLAC TROMETHAMINE 30 MG/ML
15 INJECTION, SOLUTION INTRAMUSCULAR; INTRAVENOUS 4 TIMES DAILY
Status: COMPLETED | OUTPATIENT
Start: 2023-04-03 | End: 2023-04-05

## 2023-04-03 RX ORDER — IBUPROFEN 200 MG
15 TABLET ORAL
Status: DISCONTINUED | OUTPATIENT
Start: 2023-04-03 | End: 2023-04-08 | Stop reason: HOSPADM

## 2023-04-03 RX ORDER — SODIUM CHLORIDE 450 MG/100ML
75 INJECTION, SOLUTION INTRAVENOUS CONTINUOUS
Status: ACTIVE | OUTPATIENT
Start: 2023-04-03 | End: 2023-04-04

## 2023-04-03 RX ORDER — VANCOMYCIN HCL IN 5 % DEXTROSE 1G/250ML
1000 PLASTIC BAG, INJECTION (ML) INTRAVENOUS
Status: COMPLETED | OUTPATIENT
Start: 2023-04-03 | End: 2023-04-04

## 2023-04-03 RX ORDER — IBUPROFEN 200 MG
24 TABLET ORAL
Status: DISCONTINUED | OUTPATIENT
Start: 2023-04-03 | End: 2023-04-08 | Stop reason: HOSPADM

## 2023-04-03 RX ORDER — INSULIN ASPART 100 [IU]/ML
0-5 INJECTION, SOLUTION INTRAVENOUS; SUBCUTANEOUS
Status: DISCONTINUED | OUTPATIENT
Start: 2023-04-03 | End: 2023-04-08 | Stop reason: HOSPADM

## 2023-04-03 RX ORDER — ONDANSETRON 4 MG/1
8 TABLET, ORALLY DISINTEGRATING ORAL EVERY 8 HOURS PRN
Status: DISCONTINUED | OUTPATIENT
Start: 2023-04-03 | End: 2023-04-08 | Stop reason: HOSPADM

## 2023-04-03 RX ORDER — IBUPROFEN 200 MG
30 TABLET ORAL
Status: DISCONTINUED | OUTPATIENT
Start: 2023-04-03 | End: 2023-04-08 | Stop reason: HOSPADM

## 2023-04-03 RX ORDER — SODIUM CHLORIDE 0.9 % (FLUSH) 0.9 %
10 SYRINGE (ML) INJECTION
Status: DISCONTINUED | OUTPATIENT
Start: 2023-04-03 | End: 2023-04-08 | Stop reason: HOSPADM

## 2023-04-03 RX ORDER — TALC
6 POWDER (GRAM) TOPICAL NIGHTLY PRN
Status: DISCONTINUED | OUTPATIENT
Start: 2023-04-03 | End: 2023-04-08 | Stop reason: HOSPADM

## 2023-04-03 RX ORDER — ONDANSETRON 2 MG/ML
4 INJECTION INTRAMUSCULAR; INTRAVENOUS EVERY 8 HOURS PRN
Status: DISCONTINUED | OUTPATIENT
Start: 2023-04-03 | End: 2023-04-08 | Stop reason: HOSPADM

## 2023-04-03 RX ORDER — CEFAZOLIN SODIUM 2 G/50ML
2 SOLUTION INTRAVENOUS
Status: COMPLETED | OUTPATIENT
Start: 2023-04-03 | End: 2023-04-04

## 2023-04-03 RX ORDER — LANOLIN ALCOHOL/MO/W.PET/CERES
400 CREAM (GRAM) TOPICAL DAILY
Status: DISCONTINUED | OUTPATIENT
Start: 2023-04-04 | End: 2023-04-08 | Stop reason: HOSPADM

## 2023-04-03 RX ORDER — PHENYLEPHRINE HCL IN 0.9% NACL 1 MG/10 ML
SYRINGE (ML) INTRAVENOUS
Status: DISCONTINUED | OUTPATIENT
Start: 2023-04-03 | End: 2023-04-03

## 2023-04-03 RX ORDER — CEFUROXIME SODIUM 1.5 G/16ML
INJECTION, POWDER, FOR SOLUTION INTRAVENOUS
Status: DISPENSED
Start: 2023-04-03 | End: 2023-04-04

## 2023-04-03 RX ORDER — DOCUSATE SODIUM 100 MG/1
100 CAPSULE, LIQUID FILLED ORAL 2 TIMES DAILY
Status: DISCONTINUED | OUTPATIENT
Start: 2023-04-03 | End: 2023-04-08 | Stop reason: HOSPADM

## 2023-04-03 RX ADMIN — HYDROMORPHONE HYDROCHLORIDE 0.2 MG: 2 INJECTION INTRAMUSCULAR; INTRAVENOUS; SUBCUTANEOUS at 06:04

## 2023-04-03 RX ADMIN — FENTANYL CITRATE 100 MCG: 50 INJECTION, SOLUTION INTRAMUSCULAR; INTRAVENOUS at 05:04

## 2023-04-03 RX ADMIN — NALOXONE HYDROCHLORIDE 0.4 MG: 0.4 INJECTION, SOLUTION INTRAMUSCULAR; INTRAVENOUS; SUBCUTANEOUS at 07:04

## 2023-04-03 RX ADMIN — Medication 200 MCG: at 05:04

## 2023-04-03 RX ADMIN — VASOPRESSIN 1 UNITS: 20 INJECTION INTRAVENOUS at 05:04

## 2023-04-03 RX ADMIN — HYDROMORPHONE HYDROCHLORIDE 0.2 MG: 2 INJECTION INTRAMUSCULAR; INTRAVENOUS; SUBCUTANEOUS at 07:04

## 2023-04-03 RX ADMIN — CEFTRIAXONE SODIUM 1 G: 1 INJECTION, POWDER, FOR SOLUTION INTRAMUSCULAR; INTRAVENOUS at 08:04

## 2023-04-03 RX ADMIN — Medication 300 MCG: at 05:04

## 2023-04-03 RX ADMIN — Medication 500 MCG: at 05:04

## 2023-04-03 RX ADMIN — ALBUMIN HUMAN: 250 SOLUTION INTRAVENOUS at 05:04

## 2023-04-03 RX ADMIN — Medication 400 MCG: at 05:04

## 2023-04-03 RX ADMIN — HYDROCORTISONE SODIUM SUCCINATE 100 MG: 100 INJECTION, POWDER, FOR SOLUTION INTRAMUSCULAR; INTRAVENOUS at 05:04

## 2023-04-03 RX ADMIN — SODIUM CHLORIDE 75 ML/HR: 4.5 INJECTION, SOLUTION INTRAVENOUS at 07:04

## 2023-04-03 RX ADMIN — INSULIN HUMAN 16 UNITS: 100 INJECTION, SOLUTION PARENTERAL at 03:04

## 2023-04-03 RX ADMIN — KETOROLAC TROMETHAMINE 15 MG: 30 INJECTION, SOLUTION INTRAMUSCULAR; INTRAVENOUS at 10:04

## 2023-04-03 RX ADMIN — IOPAMIDOL 100 ML: 755 INJECTION, SOLUTION INTRAVENOUS at 12:04

## 2023-04-03 RX ADMIN — PROPOFOL 100 MG: 10 INJECTION, EMULSION INTRAVENOUS at 05:04

## 2023-04-03 RX ADMIN — SUGAMMADEX 200 MG: 100 INJECTION, SOLUTION INTRAVENOUS at 06:04

## 2023-04-03 RX ADMIN — LIDOCAINE HYDROCHLORIDE 80 MG: 20 INJECTION, SOLUTION EPIDURAL; INFILTRATION; INTRACAUDAL; PERINEURAL at 05:04

## 2023-04-03 RX ADMIN — IOPAMIDOL 100 ML: 755 INJECTION, SOLUTION INTRAVENOUS at 10:04

## 2023-04-03 RX ADMIN — GLYCOPYRROLATE 0.2 MG: 0.2 INJECTION INTRAMUSCULAR; INTRAVENOUS at 04:04

## 2023-04-03 RX ADMIN — INSULIN HUMAN 4 UNITS: 100 INJECTION, SOLUTION PARENTERAL at 04:04

## 2023-04-03 RX ADMIN — ONDANSETRON HYDROCHLORIDE 4 MG: 2 INJECTION, SOLUTION INTRAMUSCULAR; INTRAVENOUS at 04:04

## 2023-04-03 RX ADMIN — SODIUM CHLORIDE, SODIUM GLUCONATE, SODIUM ACETATE, POTASSIUM CHLORIDE AND MAGNESIUM CHLORIDE: 526; 502; 368; 37; 30 INJECTION, SOLUTION INTRAVENOUS at 04:04

## 2023-04-03 RX ADMIN — CALCIUM CHLORIDE INJECTION 1 G: 100 INJECTION, SOLUTION INTRAVENOUS at 05:04

## 2023-04-03 RX ADMIN — MUPIROCIN 1 G: 20 OINTMENT TOPICAL at 10:04

## 2023-04-03 RX ADMIN — ACETAMINOPHEN 1000 MG: 500 TABLET ORAL at 11:04

## 2023-04-03 RX ADMIN — VANCOMYCIN HYDROCHLORIDE 1000 MG: 1 INJECTION, POWDER, LYOPHILIZED, FOR SOLUTION INTRAVENOUS at 10:04

## 2023-04-03 RX ADMIN — CEFAZOLIN SODIUM 2 G: 2 SOLUTION INTRAVENOUS at 10:04

## 2023-04-03 RX ADMIN — ROCURONIUM BROMIDE 100 MG: 10 SOLUTION INTRAVENOUS at 05:04

## 2023-04-03 RX ADMIN — AZITHROMYCIN MONOHYDRATE 500 MG: 500 INJECTION, POWDER, LYOPHILIZED, FOR SOLUTION INTRAVENOUS at 09:04

## 2023-04-03 NOTE — ED PROVIDER NOTES
Encounter Date: 4/3/2023    SCRIBE #1 NOTE: I, Veronica Robert, am scribing for, and in the presence of,  Sumeet Cohn IV, MD. I have scribed the following portions of the note - the EKG reading. Other sections scribed: HPI, ROS and physical.     History     Chief Complaint   Patient presents with    Chest Pain     Pt of dr. Childress with combined systolic and diastolic HF with a recent ICD implant Thursday called for epigastric pain and SOB.  systolic. Pt then received 1 spray ntg and BP to 80s. Pt then received 400 ml fluid and improved to 90s with an additional spray of NTG and NTG paste. Pt arrives Systolic BP of 91. Pt arrives with squeezing midline CP and constant SOB. NV in route and received zofran     73 y/o female with a medical hx of HTN, CHF, DM presents to the ED via EMS for intermittent epigastric pain and SOB s/p ICD placement 2 weeks ago. Pt reports that her daughter called EMS because she was unable to walk herself to the bathroom, and has been having frequent episodes of vomiting and diarrhea. Reports SOB, abdominal pain, n/v/d and CP since ICD placement. Denies fever, chills, sore throat, cough, burning upon urination, dysuria.    Per chart review: The pt had a [employer] biventricular ICDpacemaker inserted 3/23/23 by Dr. Boykin at [facility]. Pt had a recent LHC which showed an EF of 30-35% and normal coronary arteries.     Per EMS summary: The pt was given Nitro en route which dropped her BP, in which she was then given fluid to improve her BP.     The pt's PCP is MIKE Julio.    The history is provided by the patient. No  was used.   Review of patient's allergies indicates:  No Known Allergies  Past Medical History:   Diagnosis Date    Benign paroxysmal vertigo, bilateral     Cardiomyopathy     Carotid artery stenosis     Bilateral    Diabetes mellitus     HTN (hypertension)     Hyperlipidemia     Hypertension     Left bundle branch block      Past Surgical History:    Procedure Laterality Date     SECTION N/A     INSERTION OF BIVENTRICULAR IMPLANTABLE CARDIOVERTER-DEFIBRILLATOR (ICD) N/A 3/23/2023    Procedure: INSERTION, ICD, BIVENTRICULAR;  Surgeon: Demario Childress MD;  Location: [facility] CATH LAB;  Service: Cardiology;  Laterality: N/A;  BIV ICD (SJM)    LEFT HEART CATHETERIZATION Left 2022    Procedure: CATHETERIZATION, HEART, LEFT;  Surgeon: Chen Snyder MD;  Location: [facility] CATH LAB;  Service: Cardiology;  Laterality: Left;  LHC VIA RRA    TONSILLECTOMY       No family history on file.  Social History     Tobacco Use    Smoking status: Never    Smokeless tobacco: Never   Substance Use Topics    Alcohol use: Not Currently    Drug use: Never     Review of Systems   Constitutional:  Negative for chills and fever.   HENT:  Negative for congestion, rhinorrhea and sore throat.    Eyes:  Negative for visual disturbance.   Respiratory:  Positive for shortness of breath. Negative for cough.    Cardiovascular:  Positive for chest pain. Negative for leg swelling.   Gastrointestinal:  Positive for abdominal pain, diarrhea, nausea and vomiting.   Genitourinary:  Negative for difficulty urinating, dysuria, hematuria, vaginal bleeding and vaginal discharge.   Musculoskeletal:  Negative for joint swelling.   Skin:  Negative for rash.   Neurological:  Negative for weakness.   Psychiatric/Behavioral:  Negative for confusion.      Physical Exam     Initial Vitals [23 0648]   BP Pulse Resp Temp SpO2   (!) 91/43 71 18 97 °F (36.1 °C) 100 %      MAP       --         Physical Exam    Nursing note and vitals reviewed.  Constitutional: She is not diaphoretic. No distress.   Ill appearing, pale    HENT:   Mouth/Throat: Mucous membranes are dry.   Cardiovascular:  Normal rate and regular rhythm.           No murmur heard.  Pulmonary/Chest: Breath sounds normal. No respiratory distress. She has no wheezes. She has no rales.   ICD insertion site left chest wall, C/D/I   Abdominal:  Abdomen is soft. She exhibits no distension. There is no abdominal tenderness.     Neurological: She is alert.   Psychiatric: She has a normal mood and affect.       ED Course   Critical Care    Date/Time: 4/4/2023 6:44 AM  Performed by: Sumeet Cohn IV, MD  Authorized by: Sumeet Cohn IV, MD   Total critical care time (exclusive of procedural time) : 45 minutes  Critical care time was exclusive of separately billable procedures and treating other patients.  Critical care was time spent personally by me on the following activities: blood draw for specimens, development of treatment plan with patient or surrogate, discussions with consultants, interpretation of cardiac output measurements, evaluation of patient's response to treatment, examination of patient, ordering and performing treatments and interventions, ordering and review of laboratory studies, ordering and review of radiographic studies, obtaining history from patient or surrogate, re-evaluation of patient's condition, pulse oximetry and review of old charts.      Labs Reviewed   COMPREHENSIVE METABOLIC PANEL - Abnormal; Notable for the following components:       Result Value    Chloride 108 (*)     Glucose Level 397 (*)     Protein Total 4.8 (*)     Albumin Level 2.9 (*)     Globulin 1.9 (*)     Alkaline Phosphatase 25 (*)     All other components within normal limits   B-TYPE NATRIURETIC PEPTIDE - Abnormal; Notable for the following components:    Natriuretic Peptide 141.3 (*)     All other components within normal limits   PROTIME-INR - Abnormal; Notable for the following components:    PT 15.0 (*)     All other components within normal limits   CBC WITH DIFFERENTIAL - Abnormal; Notable for the following components:    RBC 2.69 (*)     Hgb 8.8 (*)     Hct 25.7 (*)     MCV 95.5 (*)     MCH 32.7 (*)     MPV 11.3 (*)     All other components within normal limits   POCT GLUCOSE - Abnormal; Notable for the following components:    POCT Glucose 383 (*)      All other components within normal limits   POCT GLUCOSE - Abnormal; Notable for the following components:    POCT Glucose 315 (*)     All other components within normal limits   TROPONIN I - Normal   COVID/FLU A&B PCR - Normal    Narrative:     The Xpert Xpress SARS-CoV-2/FLU/RSV plus is a rapid, multiplexed real-time PCR test intended for the simultaneous qualitative detection and differentiation of SARS-CoV-2, Influenza A, Influenza B, and respiratory syncytial virus (RSV) viral RNA in either nasopharyngeal swab or nasal swab specimens.         LIPASE - Normal   BLOOD CULTURE OLG   BLOOD CULTURE OLG   CBC W/ AUTO DIFFERENTIAL    Narrative:     The following orders were created for panel order CBC auto differential.  Procedure                               Abnormality         Status                     ---------                               -----------         ------                     CBC with Differential[[id number]]        Abnormal            Final result                 Please view results for these tests on the individual orders.   TYPE & SCREEN   ABORH RETYPE   POCT GLUCOSE MONITORING CONTINUOUS   PREPARE RBC SOFT     EKG Readings: (Independently Interpreted)   Initial Reading: No STEMI. Rhythm: Paced Rhythm. Heart Rate: 72. Ectopy: No Ectopy. Conduction: Normal. ST Segments: Normal ST Segments. T Waves: Normal.   0647. Atrial-sensed Ventricular paced rhythm. No ischemic changes. Prolonged QRS.    ECG Results              EKG 12-lead (Final result)  Result time 04/03/23 13:01:33      Final result by Interface, Lab In [facility] (04/03/23 13:01:33)                   Narrative:    Test Reason : R07.9,    Vent. Rate : 072 BPM     Atrial Rate : 072 BPM     P-R Int : 168 ms          QRS Dur : 132 ms      QT Int : 486 ms       P-R-T Axes : 008 001 134 degrees     QTc Int : 532 ms    Atrial-sensed ventricular-paced rhythm  Abnormal ECG  When compared with ECG of 23-MAR-2023 15:55,  Vent. rate has increased BY   6  BPM  Confirmed by Regis Rios MD ([id number]) on 4/3/2023 1:01:14 PM    Referred By:             Confirmed By:Regis Rios MD                                  Imaging Results               CTA Chest Aorta Non Coronary (Final result)  Result time 04/03/23 15:35:12      Final result by Chris Joaquin MD (04/03/23 15:35:12)                   Narrative:    EXAMINATION  CTA CHEST AORTA NON CORONARY    CLINICAL HISTORY  possible active lung bleed, hemothorax;    TECHNIQUE  Pre- and post-contrast helical-acquisition CT images were obtained; imaging timed to coincide with arterial opacification for purpose of CT angiography.  Multiplanar reconstructions, to include MIP and volume-rendered (3D) images, were accomplished by a CT technologist at a separate workstation and pushed to PACS for physician review.    CONTRAST  IV: ISOVUE-370, 100 mL    COMPARISON  Routine post-contrast chest CT performed earlier the same day.    FINDINGS  Images were reviewed in soft tissue, lung, and bone windows.    Exam quality: adequate for evaluation    Lines/tubes: No interval change.    Previously described massive heterogeneous left pleural effusion consistent with hemothorax again noted.  Areas of lingular hyperdensity concerning for potential contrast extravasation due to active hemorrhage or less pronounced.  However, there is a faint focus of hyperdensity on the initial post-contrast acquisition that is not readily appreciated on the pre-contrast imaging and suspected to represent minimal localized bleeding arising from the anterior left 5th intercostal artery (series 32, images 252-254; series 28, images 9-12; series 51, images 44-45).  No significant enlargement or other interval changes are appreciated on the delayed acquisition.    No other sites of abnormal pleural opacities suggestive of additional active hemorrhage appreciated.  There is no focal contour irregularity of the myometrium or pericardium, and no significant worsening of  pericardial fluid.  The large mediastinal vessels are unchanged in comparison.  No new or worsened short interval changes of the right hemithorax, thoracic wall soft tissues and osseous structures, or included upper abdomen appreciated.    IMPRESSION  1. No significant short interval change of the large left hemothorax volume and adjacent lung parenchymal abnormalities.  2. Persistent, but less pronounced focus of contrast blush neck may arrive from the anterior left 5th intercostal artery but is otherwise again of indeterminate source.  3. Remaining findings unchanged.  ==========    This report was flagged in Epic as abnormal.    RADIATION DOSE  Automated tube current modulation, weight-based exposure dosing, and/or iterative reconstruction technique utilized to reach lowest reasonably achievable exposure rate.    DLP: 654 mGy*cm      Electronically signed by: Chris Joaquin  Date:    04/03/2023  Time:    15:35                                      CT Chest With Contrast (Final result)  Result time 04/03/23 11:54:54      Final result by Chris Joaquin MD (04/03/23 11:54:54)                   Narrative:    EXAMINATION  CT CHEST WITH CONTRAST    CLINICAL HISTORY  Pneumonia, unresolved;    TECHNIQUE  Post-contrast helical-acquisition CT images were obtained and multiplanar reformats accomplished by a CT technologist at a separate workstation, pushed to PACS for physician review.    CONTRAST  IV: ISOVUE-370, 100 mL    COMPARISON  *No prior cross-sectional imaging is available for direct comparison.  *Radiographs obtained earlier the same day were reviewed.    FINDINGS  Images were reviewed in lung, soft tissue, and bone windows.    Exam quality: adequate for evaluation    Lines/tubes: Left chest wall pacemaker and associated leads are again appreciated.    Heart chambers are mildly prominent volume by overall visual appearance.  No significant pericardial fluid is identified.  Scattered aortic mural calcification is  present, as well as calcific densities at the arch and upper abdominal branch vessels.  No focal vessel contour irregularity or aneurysmal dilatation is identified.    Central airways are widely patent.  Irregular ground-glass and more consolidative airspace densities are scattered through the left upper lobe.  The left lower lobe is completely compressed by large volume layering pleural fluid.  Overall fluid is heterogeneous, with irregular areas of increased attenuation, as well as several focal hyperdensities in the region of the lingula that are similar attenuation to intraluminal contrast (series 2, images 65-75).  Differentiation of potential hematoma versus compressed lung is difficult through this region, but there is expected complete loss of airspace volume due to compression involving the left lower lobe.  No clear source for potential active bleeding.  The right lung is predominantly clear, with no significant pleural fluid identified.  There is no pneumothorax.    No pathologic lymph node enlargement or necrotic adenopathy is evident.  Thyroid gland is normal for CT assessment.  The visualized upper abdominal structures are without evidence of acute or suspicious focal process.  Both kidneys enhance in temporally symmetric fashion.    There is no focal abnormality of the thoracic wall subcutaneous tissues.  Regional musculature is unremarkable.  No acute osseous displacement or destructive skeletal lesion is evident.  Degenerative alterations are present throughout the visualized spinal column and both shoulders.    IMPRESSION  1. Large volume left hemothorax, with question of possible contrast blush due to active hemorrhage at the region of the lingula.  Given overall location and injury orientation, this could represent sequela of recent procedure such is pericardiocentesis with resulting anterior intercostal artery injury.  Otherwise, definite source of suspected hemorrhage is not clearly  discernible.  2. Complete compressive atelectasis of the left lower lobe, with scattered irregular ground-glass and more consolidative airspace densities through the mildly compressed left upper lobe.  3. No other evidence of acute or suspicious focal intrathoracic abnormality.  Additional secondary details discussed above.  ==========    The above findings were discussed via telephone with Dr. Cohn (Emergency Dept) prior to completion of the final report (4/3/2023; 11:19).    This report was flagged in Epic as abnormal.    RADIATION DOSE  Automated tube current modulation, weight-based exposure dosing, and/or iterative reconstruction technique utilized to reach lowest reasonably achievable exposure rate.    DLP: 163 mGy*cm      Electronically signed by: Chris Joaquin  Date:    04/03/2023  Time:    11:54                                     X-Ray Chest 1 View (Final result)  Result time 04/03/23 07:26:47   Procedure changed from X-Ray Chest PA And Lateral     Final result by Pierce Zamarripa MD (04/03/23 07:26:47)                   Impression:      Worsening opacification of the left lower lobe concerning for infectious process.      Electronically signed by: Pierce Zamarripa  Date:    04/03/2023  Time:    07:26               Narrative:    EXAMINATION:  XR CHEST 1 VIEW    CLINICAL HISTORY:  Chest Pain;    TECHNIQUE:  Single view of the chest    COMPARISON:  03/23/2023    FINDINGS:  Interval development of left lower lobe opacification with associated left effusion.  The right hemithorax is clear.                                       Medications   0.9%  NaCl infusion (for blood administration) (has no administration in time range)   sodium chloride 0.9% flush 10 mL (has no administration in time range)   ondansetron injection 4 mg (has no administration in time range)   atorvastatin tablet 10 mg (has no administration in time range)   magnesium oxide tablet 400 mg (has no administration in time range)   insulin aspart  U-100 injection 0-5 Units (has no administration in time range)   glucose chewable tablet 16 g (has no administration in time range)   glucose chewable tablet 24 g (has no administration in time range)   dextrose 10% bolus 125 mL 125 mL (has no administration in time range)   dextrose 10% bolus 250 mL 250 mL (has no administration in time range)   glucagon (human recombinant) injection 1 mg (has no administration in time range)   glucose chewable tablet 16 g (has no administration in time range)   glucose chewable tablet 32 g (has no administration in time range)   insulin regular injection 4 Units 0.04 mL (has no administration in time range)   cefUROXime sodium 1.5 gram SolR (has no administration in time range)   hydrocortisone sodium succinate (SOLU-CORTEF) 100 mg injection (has no administration in time range)   0.45% NaCl infusion (75 mL/hr Intravenous New Bag 4/3/23 1930)   enoxaparin injection 40 mg (has no administration in time range)   vancomycin in dextrose 5 % 1 gram/250 mL IVPB 1,000 mg (0 mg Intravenous Stopped 4/3/23 2334)   mupirocin 2 % ointment 1 g (1 g Nasal Given 4/3/23 2204)   famotidine tablet 20 mg (0 mg Oral Hold 4/3/23 2100)   acetaminophen tablet 650 mg (has no administration in time range)   ketorolac injection 15 mg (15 mg Intravenous Given 4/4/23 0508)   oxyCODONE immediate release tablet 5 mg (has no administration in time range)   ondansetron disintegrating tablet 8 mg (has no administration in time range)   metoclopramide HCl injection 5 mg (has no administration in time range)   melatonin tablet 6 mg (has no administration in time range)   docusate sodium capsule 100 mg (0 mg Oral Hold 4/3/23 2100)   loperamide capsule 4 mg (4 mg Oral Not Given 4/3/23 1930)   cefTRIAXone (ROCEPHIN) 1 g in dextrose 5 % in water (D5W) 5 % 50 mL IVPB (MB+) (0 g Intravenous Stopped 4/3/23 0915)   azithromycin 500 mg in dextrose 5 % 250 mL IVPB (ready to mix system) (0 mg Intravenous Stopped 4/3/23 1021)    iopamidoL (ISOVUE-370) injection 100 mL (100 mLs Intravenous Given 4/3/23 1006)   acetaminophen tablet 1,000 mg (1,000 mg Oral Given 4/3/23 1130)   iopamidoL (ISOVUE-370) injection 100 mL (100 mLs Intravenous Given 4/3/23 1205)   insulin regular injection 16 Units 0.16 mL (16 Units Intravenous Given 4/3/23 1558)   insulin regular 100 unit/mL injection (4 Units Intravenous Given 4/3/23 1645)   cefazolin (ANCEF) 2 gram in dextrose 5% 50 mL IVPB (premix) (0 g Intravenous Stopped 4/4/23 0538)   naloxone (NARCAN) 0.4 mg/mL injection (0.4 mg  Given 4/3/23 1946)              Scribe Attestation:   Scribe #1: I performed the above scribed service and the documentation accurately describes the services I performed. I attest to the accuracy of the note.    Attending Attestation:           Physician Attestation for Scribe:  Physician Attestation Statement for Scribe #1: I, Sumeet Cohn IV, MD, reviewed documentation, as scribed by Veronica Robert in my presence, and it is both accurate and complete.         Medical Decision Making  Problems Addressed:  Chest pain: acute illness or injury that poses a threat to life or bodily functions  Hemothorax on left: acute illness or injury that poses a threat to life or bodily functions  Nausea and vomiting, unspecified vomiting type: acute illness or injury that poses a threat to life or bodily functions      ED assessment:    This is a 72-year-old female presenting with shortness of breath nausea vomiting chest pain 2 weeks after ICD pacemaker implantation with [facility]  Chest x-ray revealed a new left effusion versus consolidation   Initially treated as pneumonia CT chest obtained given recent procedure to evaluate for post procedural complication  CT revealed effusion was hemothorax possible active blush at the lingula  [facility] consulted recommended Cardiothoracic involvement Katlin consulted  Hemoglobin 8.8 no prior for comparison gave 2 units blood initially hypotensive but improved with  blood satting well on room air  Katlin took for VATS admitted [facility]    Differential diagnosis (including but not limited to):   Gastroenteritis, covid, flu, dehydration, pneumonia, uti, pyelo,     ED management:   Blood transfusion IV fluids IV antibiotics    My independent radiology interpretation:   CXR - L effusion vs considation   Point of care US (independently performed and interpreted): IVC collapsible with respiration, no pericardial effusion     Amount and/or Complexity of Data Reviewed  Independent historian: EMS  Summary of history: The pt was given Nitro en route which dropped her BP, in which she was then given fluid to improve her BP.   External data reviewed: notes from previous admissions, notes from clinic visits, prior labs, prior EKGs, prescription medications , and previous procedure and OR notes  Summary of data reviewed: The pt had a [employer] ICD and biventricular pacemaker inserted 3/23/23 by Dr. Boykin at [facility]. Pt had a recent LHC which showed an EF of 30-35% and normal coronary arteries.  Risk and benefits of testing: discussed   Labs: ordered and reviewed  Radiology: ordered and independent interpretation performed (see above or ED course)  ECG/medicine tests: ordered and independent interpretation performed (see above or ED course)  Discussion of management or test interpretation with external provider(s): discussed with [facility] and cardiothoracic consultant   Summary of discussion: will admit, cardiothoracic to take to OR     Risk  Decision regarding hospitalization  Emergency major surgery     Critical Care  30-74 minutes     Sumeet LEE MD personally performed the history, PE, MDM, and procedures as documented above and agree with the scribe's documentation.        ED Course as of 04/04/23 0644   Mon Apr 03, 2023   0723 72-year-old with a history of heart failure - presenting for generalized weakness nausea vomiting diarrhea intermittent epigastric pain - recent ICD pacemaker insertion by  [facility] 2 weeks ago.  On EMS arrival patient hypotensive given nitro became more hypotensive - was then given fluids with improvement in blood pressure.  Hypotensive to 80s over 50s with a map in the 50s here overall ill-appearing but mentating well does appear dry on exam bedside ultrasound IVC collapsible with respiration no notable pericardial effusion on my bedside echo.  Will hydrate with IV fluids 500 cc at a time given history of CHF obtain labs continue to reassess at some point will discuss with [facility] given recent procedure.  [AC]   0802 Workup with new left-sided lung opacification hemoglobin of 8.8 no prior hemoglobin on file patient denies a history of anemia will treat as a pneumonia will require admission will obtain a CT chest to ensure that her left-sided lung findings are not a hemothorax in the setting of a new onset anemia [AC]   0805 Improved BP, still somewhat hypotensive. Will continue IVF gentle resuscitation  [AC]   1059 X-Ray Chest 1 View [AC]   1129 CT shows hemothorax possible active blush at the left lung lingula radiology does not see any abnormalities of the heart or pacemaker, no pericardial effusion no traumatic findings either.  Radiology recommended possible CTA. My 1st call his to [facility] due to recent procedure with them they agree with CTA 2 units of blood transfusion they will speak with their attending and get back with recommendations [AC]   1138 Cardiothoracic paged [SJ]   1143 [facility] recommends Cardiothoracic surgery consultation at this time [AC]   1152 Dr. Dalal Cardiothoracic will come evaluate the patient [AC]   1203 HDS at this time, low threshold to place chest tube if patient decompensates  [AC]   1447 Spoke with [facility] they are reviewing CT images right now they will communicate with Cardiothoracic surgery for further plan at this time CTS planning for surgery tomorrow [AC]   1448 BP improving with blood transfusion now 110s, still satting well on RA  [AC]   1449 [facility] will admit    [AC]      ED Course User Index  [AC] Sumeet Cohn IV, MD  [SJ] Veronica Robert                 Clinical Impression:   Final diagnoses:  [R07.9] Chest pain  [I95.9] Hypotension, unspecified hypotension type  [R11.2] Nausea and vomiting, unspecified vomiting type  [E86.0] Dehydration  [J94.2] Hemothorax on left (Primary)        ED Disposition Condition    Admit Stable                Sumeet Cohn IV, MD  04/04/23 0644

## 2023-04-03 NOTE — ANESTHESIA PROCEDURE NOTES
Arterial    Diagnosis: Thoracic Surgery    Patient location during procedure: holding area  Procedure start time: 4/3/2023 4:41 PM  Timeout: 4/3/2023 4:40 PM  Procedure end time: 4/3/2023 4:57 PM    Staffing  Authorizing Provider: Dannie Martinez DO  Performing Provider: Vincent Alvarez CRNA    Anesthesiologist was present at the time of the procedure.    Preanesthetic Checklist  Completed: patient identified, IV checked, site marked, risks and benefits discussed, surgical consent, monitors and equipment checked, pre-op evaluation, timeout performed and anesthesia consent givenArterial  Skin Prep: chlorhexidine gluconate  Local Infiltration: lidocaine  Location: brachial    Catheter Size: 20 G  Catheter placement by Anatomical landmarks. Heme positive aspiration all ports. Insertion Attempts: 3  Assessment  Dressing: secured with tape and tegaderm  Patient: Tolerated well

## 2023-04-03 NOTE — CONSULTS
Chief complaint shortness of breath  History of present illness   The patient is a 72-year-old female with a history of cardiomyopathy recently had an AICD placed  23rd of March.  She now has shortness of breath and anemia.  A CT scan of the chest reveals a large left hemothorax.  The patient's blood pressure has been stable.  She will need evacuation of this left hemothorax.  Hopefully we can do this through a thoracoscopic approach.  It may need to be opened depending on the extent of the clot.  I have discussed the risks and benefits in detail with the patient and her family.  They understand and would like to proceed.  Past surgical history significant for AICD   Past medical history significant for nonischemic cardiomyopathy with an EF of 30-35%  Social history is negative   Family history noncontributory   Review of systems positive for shortness of breath and left-sided chest pain    Physical examination currently the patient is in mild pain  HEENT pupils equal reactive to light accommodation, she is normocephalic and atraumatic   Neck is no jugular venous distention   Lungs have markedly diminished breath sounds on the left   Heart has a regular rate and rhythm without murmurs or gallops   Abdomen is soft and nontender  Extremities have no cyanosis clubbing edema   Impression:  Large left hemothorax following implantation of AICD   Cardiomyopathy with EF of 30-35%   Plan: Left VATS evacuation of hemothorax

## 2023-04-03 NOTE — ANESTHESIA PROCEDURE NOTES
Intubation    Date/Time: 4/3/2023 5:05 PM  Performed by: Vincent Alvarez CRNA  Authorized by: Dannie Martinez DO     Intubation:     Induction:  Intravenous    Intubated:  Postinduction    Mask Ventilation:  Easy with oral airway    Attempts:  1    Attempted By:  CRNA    Method of Intubation:  Direct    Blade:  Godinez 3    Laryngeal View Grade: Grade I - full view of cords      Difficult Airway Encountered?: No      Complications:  None    Airway Device:  Oral endotracheal tube    Airway Device Size:  35F    Style/Cuff Inflation:  Cuffed (inflated to minimal occlusive pressure)    Tube secured:  28    Secured at:  The lips    Placement Verified By:  Capnometry    Complicating Factors:  None    Findings Post-Intubation:  Atraumatic/condition of teeth unchanged and BS equal bilateral

## 2023-04-03 NOTE — TRANSFER OF CARE
"Anesthesia Transfer of Care Note    Patient: Michelle Rodríguez    Procedure(s) Performed: Procedure(s) (LRB):  VATS (VIDEO-ASSISTED THORACOSCOPIC SURGERY) (Left)    Patient location: PACU    Anesthesia Type: general    Transport from OR: Transported from OR on room air with adequate spontaneous ventilation    Post pain: adequate analgesia    Post assessment: no apparent anesthetic complications    Post vital signs: stable    Level of consciousness: sedated    Nausea/Vomiting: no nausea/vomiting    Complications: none    Transfer of care protocol was followed      Last vitals:   Visit Vitals  BP (!) 175/68   Pulse 83   Temp 37 °C (98.6 °F)   Resp 17   Ht 5' 1.81" (1.57 m)   Wt 57.2 kg (126 lb)   SpO2 (!) 94%   BMI 23.19 kg/m²     "

## 2023-04-03 NOTE — CONSULTS
"Inpatient consult to Cardiology  Consult performed by: ALEJANDRO Velasquez  Consult ordered by: Matt J Landry, ANP [facility]  Cardiology  History and Physical    Patient Name: Michelle Rodríguez  MRN: [id number]  Admission Date: 4/3/2023  Hospital Length of Stay: 0 days  Code Status: Prior   Attending Provider: Sumeet Cohn IV, MD   Consulting Provider: ALEJANDRO Velasquez  Primary Care Physician: MIKE Mendoza  Principal Problem:<principal problem not specified>    Patient information was obtained from patient, past medical records, and ER records.     Subjective:     Chief Complaint: Reason for Consult: Device Implant    HPI: Ms. Rodríguez is a 73 y/o female who is known to [facility], [facility]. The patient presented to the ER on 4.3.23 with c/o Epigastric/CP. The patient reported that last week she had an ICD Placed and was doing well, however, on the morning of presentation she developed some CP that was located to her epigastric area and midsternal chest. The pain was rate 5/10 on a verbal scale that was described as a "burning" sensation. She did report that over the last few days she has been having a cough that was non-productive. Denied Fever and Chills. She was given Nitro Spray in route and she developed hypotension and was given IVF Bolus. She was evaluated in the ER and found to have: WBC 6.3, H&H 8.8/25.7 (Last H&H in Office [10.28.22]: 13.7/41.2), Glucose 397, .3, Troponin 0.032 and a CXR with Worsening Opacification of the LLL concerning for Infectious Process. A CT of the Chest was ordered and is pending. [facility] has been consulted since she recently had a ICD Placed.     PMH: NICMO/EF 30%, L BBB, HTN, DM II, Bilateral DONALD/Mild, HLD, Vertigo, Normal Coronaries   PSH: BiV CRT-D ([facility]), Tonsillectomy, , Angiogram  Family History: Father, D, 54, Cancer; Mother, D, 71, DM II; Daughter, L, Cancer  Social History: Denies Illicit Drug, ETOH and Tobacco " Use    Previous Cardiac Diagnostics:   ECHO 3.2.23:  The study quality is average.   A limited study was performed to re-evaluate the left ventricular systolic function.   Global left ventricular systolic function is moderately decreased. The left ventricular ejection fraction is 30%.     [facility] 11.21.22:  Normal Coronary Arteries    MPI 10.6.22:  This is an abnormal perfusion study.   This scan is suggestive of low to moderate risk for future cardiovascular events.   Small fixed perfusion abnormality of mild intensity in the apical segment. Small fixed perfusion abnormality of moderate intensity in the basal anteroseptal segment. Small fixed perfusion abnormality of moderate intensity in the basal inferoseptal segment.   The left ventricular cavity is noted to be normal on the stress studies. The stress left ventricular ejection fraction was calculated to be 44% and left ventricular global function is mildly reduced. The rest left ventricular cavity is noted to be mildly enlarged. The rest left ventricular ejection fraction was calculated to be 37% and rest left ventricular global function is moderately reduced.   When compared to the resting ejection fraction (37%), the stress ejection fraction (44%) has increased.   The study quality is good.   There was a rise in myocardial blood flow between rest and stress.  Global myocardial blood flow reserve was 1.74.  Myocardial blood flow reserve is globally abnormal, placing the patient at a higher coronary event risk.    Review of patient's allergies indicates:  No Known Allergies    Current Facility-Administered Medications on File Prior to Encounter   Medication    ceFAZolin injection 2 g    sodium chloride 0.9% flush 10 mL     Current Outpatient Medications on File Prior to Encounter   Medication Sig    alendronate (FOSAMAX) 70 MG tablet Take 70 mg by mouth every 7 days.    ascorbic acid, vitamin C, 500 mg Chew Take 1 tablet by mouth once daily.    aspirin (ECOTRIN) 81  MG EC tablet Take 81 mg by mouth once daily.    atorvastatin (LIPITOR) 10 MG tablet Take 10 mg by mouth twice a week.    carvediloL (COREG) 25 MG tablet Take 25 mg by mouth 2 (two) times daily.    cholecalciferol, vitamin D3, (VITAMIN D3) 25 mcg (1,000 unit) capsule Take 1,000 Units by mouth once daily.    dulaglutide (TRULICITY) 0.75 mg/0.5 mL pen injector Inject 0.75 mg into the skin every 7 days.    ENTRESTO 49-51 mg per tablet Take 1 tablet by mouth 2 (two) times daily.    glipiZIDE (GLUCOTROL) 10 MG TR24 Take 10 mg by mouth once daily.    glucagon (BAQSIMI) 3 mg/actuation Spry 3 mg by Nasal route as needed. Takes PRN---never used at this time    LANTUS SOLOSTAR U-100 INSULIN glargine 100 units/mL SubQ pen Inject 40 Units into the skin once daily.    magnesium oxide (MAG-OX) 400 mg (241.3 mg magnesium) tablet Take 400 mg by mouth once daily.    metFORMIN (GLUCOPHAGE) 1000 MG tablet Take 2,000 mg by mouth daily with breakfast.    multivitamin-minerals-lutein (MULTIVITAMIN 50 PLUS) Tab Take 1 tablet by mouth once daily.    omega 3-dha-epa-fish oil 300-1,000 mg Cap Take 3,000 mg by mouth once daily.     Review of Systems   Constitutional:  Negative for fatigue and fever.   Respiratory:  Positive for cough. Negative for chest tightness and shortness of breath.    Cardiovascular:  Positive for chest pain. Negative for palpitations and leg swelling.   Gastrointestinal:  Positive for abdominal pain. Negative for blood in stool.   All other systems reviewed and are negative.    Objective:     Vital Signs (Most Recent):  Temp: 97 °F (36.1 °C) (04/03/23 0648)  Pulse: 71 (04/03/23 0648)  Resp: 18 (04/03/23 0648)  BP: (!) 91/43 (04/03/23 0648)  SpO2: 100 % (04/03/23 0648)   Vital Signs (24h Range):  Temp:  [97 °F (36.1 °C)] 97 °F (36.1 °C)  Pulse:  [71] 71  Resp:  [18] 18  SpO2:  [100 %] 100 %  BP: (91)/(43) 91/43     Weight: 57.2 kg (126 lb)  Body mass index is 23.05 kg/m².    SpO2: 100 %       No intake or output data in  the 24 hours ending 04/03/23 1101    Lines/Drains/Airways       None                 Significant Labs:  Recent Results (from the past 72 hour(s))   Comprehensive metabolic panel    Collection Time: 04/03/23  7:02 AM   Result Value Ref Range    Sodium Level 136 136 - 145 mmol/L    Potassium Level 5.0 3.5 - 5.1 mmol/L    Chloride 108 (H) 98 - 107 mmol/L    Carbon Dioxide 23 23 - 31 mmol/L    Glucose Level 397 (H) 82 - 115 mg/dL    Blood Urea Nitrogen 12.8 9.8 - 20.1 mg/dL    Creatinine 0.78 0.55 - 1.02 mg/dL    Calcium Level Total 8.5 8.4 - 10.2 mg/dL    Protein Total 4.8 (L) 5.8 - 7.6 gm/dL    Albumin Level 2.9 (L) 3.4 - 4.8 g/dL    Globulin 1.9 (L) 2.4 - 3.5 gm/dL    Albumin/Globulin Ratio 1.5 1.1 - 2.0 ratio    Bilirubin Total 0.5 <=1.5 mg/dL    Alkaline Phosphatase 25 (L) 40 - 150 unit/L    Alanine Aminotransferase 19 0 - 55 unit/L    Aspartate Aminotransferase 17 5 - 34 unit/L    eGFR >60 mls/min/1.73/m2   Brain natriuretic peptide    Collection Time: 04/03/23  7:02 AM   Result Value Ref Range    Natriuretic Peptide 141.3 (H) <=100.0 pg/mL   Troponin I    Collection Time: 04/03/23  7:02 AM   Result Value Ref Range    Troponin-I 0.032 0.000 - 0.045 ng/mL   CBC with Differential    Collection Time: 04/03/23  7:02 AM   Result Value Ref Range    WBC 6.3 4.5 - 11.5 x10(3)/mcL    RBC 2.69 (L) 4.20 - 5.40 x10(6)/mcL    Hgb 8.8 (L) 12.0 - 16.0 g/dL    Hct 25.7 (L) 37.0 - 47.0 %    MCV 95.5 (H) 80.0 - 94.0 fL    MCH 32.7 (H) 27.0 - 31.0 pg    MCHC 34.2 33.0 - 36.0 g/dL    RDW 13.0 11.5 - 17.0 %    Platelet 141 130 - 400 x10(3)/mcL    MPV 11.3 (H) 7.4 - 10.4 fL    Neut % 68.7 %    Lymph % 21.5 %    Mono % 7.6 %    Eos % 1.4 %    Basophil % 0.3 %    Lymph # 1.36 0.6 - 4.6 x10(3)/mcL    Neut # 4.34 2.1 - 9.2 x10(3)/mcL    Mono # 0.48 0.1 - 1.3 x10(3)/mcL    Eos # 0.09 0 - 0.9 x10(3)/mcL    Baso # 0.02 0 - 0.2 x10(3)/mcL    IG# 0.03 0 - 0.04 x10(3)/mcL    IG% 0.5 %    NRBC% 0.0 %   Lipase    Collection Time: 04/03/23  7:02 AM    Result Value Ref Range    Lipase Level 17 <=60 U/L   Protime-INR    Collection Time: 04/03/23  7:13 AM   Result Value Ref Range    PT 15.0 (H) 12.5 - 14.5 seconds    INR 1.20 0.00 - 1.30   POCT glucose    Collection Time: 04/03/23  7:16 AM   Result Value Ref Range    POCT Glucose 383 (H) 70 - 110 mg/dL   Type & Screen    Collection Time: 04/03/23  7:19 AM   Result Value Ref Range    Group & Rh A NEG     Indirect Aisha GEL NEG     Specimen Outdate 04/06/2023 23:59    COVID/FLU A&B PCR    Collection Time: 04/03/23  7:22 AM   Result Value Ref Range    Influenza A PCR Not Detected Not Detected    Influenza B PCR Not Detected Not Detected    SARS-CoV-2 PCR Not Detected Not Detected, Negative, Invalid   ABORH RETYPE    Collection Time: 04/03/23  8:41 AM   Result Value Ref Range    ABORH Retype A NEG      Significant Imaging:  Imaging Results              CT Chest With Contrast (In process)  Result time 04/03/23 10:17:32                     X-Ray Chest 1 View (Final result)  Result time 04/03/23 07:26:47   Procedure changed from X-Ray Chest PA And Lateral     Final result by Pierce Zamarripa MD (04/03/23 07:26:47)                   Impression:      Worsening opacification of the left lower lobe concerning for infectious process.      Electronically signed by: Pierce Zamarripa  Date:    04/03/2023  Time:    07:26               Narrative:    EXAMINATION:  XR CHEST 1 VIEW    CLINICAL HISTORY:  Chest Pain;    TECHNIQUE:  Single view of the chest    COMPARISON:  03/23/2023    FINDINGS:  Interval development of left lower lobe opacification with associated left effusion.  The right hemithorax is clear.                                    EKG:       Telemetry: AV Paced    Physical Exam  Constitutional:       Appearance: Normal appearance.   HENT:      Head: Normocephalic.      Mouth/Throat:      Mouth: Mucous membranes are moist.   Eyes:      Extraocular Movements: Extraocular movements intact.   Cardiovascular:      Rate and  Rhythm: Normal rate and regular rhythm.      Pulses: Normal pulses.   Pulmonary:      Effort: Pulmonary effort is normal.      Breath sounds: Examination of the left-lower field reveals decreased breath sounds. Decreased breath sounds present.   Abdominal:      Palpations: Abdomen is soft.   Musculoskeletal:         General: No swelling. Normal range of motion.   Skin:     General: Skin is warm and dry.      Coloration: Skin is pale.      Comments: L CW Pacer Site with Dermabond Intact, No Sign of Bleed/Infection; Mild Ecchymosis to Incision Site   Neurological:      General: No focal deficit present.      Mental Status: She is alert and oriented to person, place, and time.   Psychiatric:         Mood and Affect: Mood normal.         Behavior: Behavior normal.     Home Medications:   Current Facility-Administered Medications on File Prior to Encounter   Medication Dose Route Frequency Provider Last Rate Last Admin    ceFAZolin injection 2 g  2 g Intravenous On Call Procedure Demario Childress MD   2 g at 03/23/23 1417    sodium chloride 0.9% flush 10 mL  10 mL Intravenous PRN Demario Childress MD         Current Outpatient Medications on File Prior to Encounter   Medication Sig Dispense Refill    alendronate (FOSAMAX) 70 MG tablet Take 70 mg by mouth every 7 days.      ascorbic acid, vitamin C, 500 mg Chew Take 1 tablet by mouth once daily.      aspirin (ECOTRIN) 81 MG EC tablet Take 81 mg by mouth once daily.      atorvastatin (LIPITOR) 10 MG tablet Take 10 mg by mouth twice a week.      carvediloL (COREG) 25 MG tablet Take 25 mg by mouth 2 (two) times daily.      cholecalciferol, vitamin D3, (VITAMIN D3) 25 mcg (1,000 unit) capsule Take 1,000 Units by mouth once daily.      dulaglutide (TRULICITY) 0.75 mg/0.5 mL pen injector Inject 0.75 mg into the skin every 7 days.      ENTRESTO 49-51 mg per tablet Take 1 tablet by mouth 2 (two) times daily.      glipiZIDE (GLUCOTROL) 10 MG TR24 Take 10 mg by mouth once daily.       glucagon (BAQSIMI) 3 mg/actuation Spry 3 mg by Nasal route as needed. Takes PRN---never used at this time      LANTUS SOLOSTAR U-100 INSULIN glargine 100 units/mL SubQ pen Inject 40 Units into the skin once daily.      magnesium oxide (MAG-OX) 400 mg (241.3 mg magnesium) tablet Take 400 mg by mouth once daily.      metFORMIN (GLUCOPHAGE) 1000 MG tablet Take 2,000 mg by mouth daily with breakfast.      multivitamin-minerals-lutein (MULTIVITAMIN 50 PLUS) Tab Take 1 tablet by mouth once daily.      omega 3-dha-epa-fish oil 300-1,000 mg Cap Take 3,000 mg by mouth once daily.       Current Inpatient Medications:  No current facility-administered medications for this encounter.    Current Outpatient Medications:     alendronate (FOSAMAX) 70 MG tablet, Take 70 mg by mouth every 7 days., Disp: , Rfl:     ascorbic acid, vitamin C, 500 mg Chew, Take 1 tablet by mouth once daily., Disp: , Rfl:     aspirin (ECOTRIN) 81 MG EC tablet, Take 81 mg by mouth once daily., Disp: , Rfl:     atorvastatin (LIPITOR) 10 MG tablet, Take 10 mg by mouth twice a week., Disp: , Rfl:     carvediloL (COREG) 25 MG tablet, Take 25 mg by mouth 2 (two) times daily., Disp: , Rfl:     cholecalciferol, vitamin D3, (VITAMIN D3) 25 mcg (1,000 unit) capsule, Take 1,000 Units by mouth once daily., Disp: , Rfl:     dulaglutide (TRULICITY) 0.75 mg/0.5 mL pen injector, Inject 0.75 mg into the skin every 7 days., Disp: , Rfl:     ENTRESTO 49-51 mg per tablet, Take 1 tablet by mouth 2 (two) times daily., Disp: , Rfl:     glipiZIDE (GLUCOTROL) 10 MG TR24, Take 10 mg by mouth once daily., Disp: , Rfl:     glucagon (BAQSIMI) 3 mg/actuation Spry, 3 mg by Nasal route as needed. Takes PRN---never used at this time, Disp: , Rfl:     LANTUS SOLOSTAR U-100 INSULIN glargine 100 units/mL SubQ pen, Inject 40 Units into the skin once daily., Disp: , Rfl:     magnesium oxide (MAG-OX) 400 mg (241.3 mg magnesium) tablet, Take 400 mg by mouth once daily., Disp: , Rfl:      metFORMIN (GLUCOPHAGE) 1000 MG tablet, Take 2,000 mg by mouth daily with breakfast., Disp: , Rfl:     multivitamin-minerals-lutein (MULTIVITAMIN 50 PLUS) Tab, Take 1 tablet by mouth once daily., Disp: , Rfl:     omega 3-dha-epa-fish oil 300-1,000 mg Cap, Take 3,000 mg by mouth once daily., Disp: , Rfl:     Facility-Administered Medications Ordered in Other Encounters:     ceFAZolin injection 2 g, 2 g, Intravenous, On Call Procedure, Demario Childress MD, 2 g at 03/23/23 1417    sodium chloride 0.9% flush 10 mL, 10 mL, Intravenous, PRN, Demario Childress MD    VTE Risk Mitigation (From admission, onward)      None          Assessment:   L Sided Hemothorax  Abnormal Radiographic Study    - CXR (4.3.23) - Worsening Opacification of the LLL concerning for Infectious Process    - CT of Chest (4.3.23) - Large volume left hemothorax, with question of possible contrast blush due to active hemorrhage at the region of the lingula.  Given overall location and injury orientation, this could represent sequela of recent procedure such is pericardiocentesis with resulting anterior intercostal artery injury.  Otherwise, definite source of suspected hemorrhage is not clearly discernible. Complete compressive atelectasis of the left lower lobe, with scattered irregular ground-glass and more consolidative airspace densities through the mildly compressed left upper lobe.    - CTA Chest (4.3.23) - No significant short interval change of the large left hemothorax volume and adjacent lung parenchymal abnormalities. Persistent, but less pronounced focus of contrast blush neck may arrive from the anterior left 5th intercostal artery but is otherwise again of indeterminate source.  Anemia (Acute Blood Loss)    - H&H (4.3.23) - 8.8/25.7    - H&H (10.28.22) - 13.7/41.2  Hypotension s/p Nitro Sprays   NICMO/EF 30%    - s/p BiV CRT-D Implant (3.23.23) - [facility]  Normal Coronaries  Hx of HTN  DM II  Hx of LBBB  Bilateral DONALD/Mild  Vertigo    Plan:    Type and Screen for 2 Units PRBCs to Transfuse - Keep 2 Units Ahead  Consult CV Surgery for Evaluation of Hemothorax   Hold Anticoagulants/Antiplatelets  Q6HR H&H after Initial Transfusion  Q1HR IS  Labs in AM: CBC, CMP and Mg    Thank you for your consult.     Juan C Holman, ALEJANDRO  Cardiology  [facility]  04/03/2023

## 2023-04-03 NOTE — OP NOTE
Date of service 04/03/2023   Preop diagnosis:  Left hemothorax  Postop diagnosis: Same  Procedure:  Left VATS evacuation of hemothorax   Surgeon: Katlin  Anesthesia:  General endotracheal  Drains: Chest tube x1     Procedure in detail:  The patient was taken to the operating room under informed consent placed on table in supine position.  General endotracheal anesthesia was induced by the anesthesia department.  She was rotated on the right side all pressure points properly padded.  The left chest prepped and draped in usual sterile fashion.  Two incisions made at the inferior portion of the chest long the mid axillary line.  Suctioned introduced in about 1 L of old blood was suctioned free the scope was then introduced and a large amount of clot was also encountered this clot was then carefully removed just picking out with a ring forceps.  Once the entire clot load was removed which was at least another 750 cc of clot, the chest was carefully examined.  There was some active bleeding along the anterior chest wall sort of underneath past.  I really do not see where this could have originated but there was some chest wall oozing there and we cauterized this which quickly stopped the bleeding.  Subclavian vein and artery were not bleeding at all and showed no evidence of trauma.  A 28 chest tube was placed through 1 of the incisions and secured to the skin.  The remaining incision closed with 2-0 Vicryl and the skin with a 3-0 subcuticular stitch.  The patient tolerated this procedure well and left the operating room in stable condition.

## 2023-04-04 LAB
ALBUMIN SERPL-MCNC: 3.2 G/DL (ref 3.4–4.8)
ALBUMIN/GLOB SERPL: 2.1 RATIO (ref 1.1–2)
ALP SERPL-CCNC: 22 UNIT/L (ref 40–150)
ALT SERPL-CCNC: 14 UNIT/L (ref 0–55)
AST SERPL-CCNC: 17 UNIT/L (ref 5–34)
BASOPHILS # BLD AUTO: 0.03 X10(3)/MCL (ref 0–0.2)
BASOPHILS NFR BLD AUTO: 0.2 %
BILIRUBIN DIRECT+TOT PNL SERPL-MCNC: 0.8 MG/DL
BUN SERPL-MCNC: 15.3 MG/DL (ref 9.8–20.1)
CALCIUM SERPL-MCNC: 8.8 MG/DL (ref 8.4–10.2)
CHLORIDE SERPL-SCNC: 106 MMOL/L (ref 98–107)
CO2 SERPL-SCNC: 26 MMOL/L (ref 23–31)
CREAT SERPL-MCNC: 0.75 MG/DL (ref 0.55–1.02)
EOSINOPHIL # BLD AUTO: 0 X10(3)/MCL (ref 0–0.9)
EOSINOPHIL NFR BLD AUTO: 0 %
ERYTHROCYTE [DISTWIDTH] IN BLOOD BY AUTOMATED COUNT: 16.2 % (ref 11.5–17)
GFR SERPLBLD CREATININE-BSD FMLA CKD-EPI: >60 MLS/MIN/1.73/M2
GLOBULIN SER-MCNC: 1.5 GM/DL (ref 2.4–3.5)
GLUCOSE SERPL-MCNC: 236 MG/DL (ref 82–115)
HCT VFR BLD AUTO: 24.7 % (ref 37–47)
HCT VFR BLD AUTO: 24.9 % (ref 37–47)
HCT VFR BLD AUTO: 25.1 % (ref 37–47)
HGB BLD-MCNC: 8.6 G/DL (ref 12–16)
HGB BLD-MCNC: 8.7 G/DL (ref 12–16)
HGB BLD-MCNC: 8.8 G/DL (ref 12–16)
IMM GRANULOCYTES # BLD AUTO: 0.04 X10(3)/MCL (ref 0–0.04)
IMM GRANULOCYTES NFR BLD AUTO: 0.3 %
LYMPHOCYTES # BLD AUTO: 2.59 X10(3)/MCL (ref 0.6–4.6)
LYMPHOCYTES NFR BLD AUTO: 19.7 %
MAGNESIUM SERPL-MCNC: 1.6 MG/DL (ref 1.6–2.6)
MCH RBC QN AUTO: 31.4 PG (ref 27–31)
MCHC RBC AUTO-ENTMCNC: 35.3 G/DL (ref 33–36)
MCV RBC AUTO: 88.9 FL (ref 80–94)
MONOCYTES # BLD AUTO: 1.05 X10(3)/MCL (ref 0.1–1.3)
MONOCYTES NFR BLD AUTO: 8 %
NEUTROPHILS # BLD AUTO: 9.44 X10(3)/MCL (ref 2.1–9.2)
NEUTROPHILS NFR BLD AUTO: 71.8 %
NRBC BLD AUTO-RTO: 0 %
PLATELET # BLD AUTO: 140 X10(3)/MCL (ref 130–400)
PMV BLD AUTO: 11.1 FL (ref 7.4–10.4)
POCT GLUCOSE: 344 MG/DL (ref 70–110)
POTASSIUM SERPL-SCNC: 4.8 MMOL/L (ref 3.5–5.1)
PROT SERPL-MCNC: 4.7 GM/DL (ref 5.8–7.6)
RBC # BLD AUTO: 2.8 X10(6)/MCL (ref 4.2–5.4)
SODIUM SERPL-SCNC: 135 MMOL/L (ref 136–145)
WBC # SPEC AUTO: 13.2 X10(3)/MCL (ref 4.5–11.5)

## 2023-04-04 PROCEDURE — 25000003 PHARM REV CODE 250: Performed by: NURSE PRACTITIONER

## 2023-04-04 PROCEDURE — 99024 PR POST-OP FOLLOW-UP VISIT: ICD-10-PCS | Mod: ,,, | Performed by: PHYSICIAN ASSISTANT

## 2023-04-04 PROCEDURE — 21400001 HC TELEMETRY ROOM

## 2023-04-04 PROCEDURE — 63600175 PHARM REV CODE 636 W HCPCS: Performed by: NURSE PRACTITIONER

## 2023-04-04 PROCEDURE — 83735 ASSAY OF MAGNESIUM: CPT | Performed by: NURSE PRACTITIONER

## 2023-04-04 PROCEDURE — 25000003 PHARM REV CODE 250: Performed by: SPECIALIST

## 2023-04-04 PROCEDURE — 99024 POSTOP FOLLOW-UP VISIT: CPT | Mod: ,,, | Performed by: PHYSICIAN ASSISTANT

## 2023-04-04 PROCEDURE — 85014 HEMATOCRIT: CPT | Performed by: NURSE PRACTITIONER

## 2023-04-04 PROCEDURE — 85025 COMPLETE CBC W/AUTO DIFF WBC: CPT | Performed by: NURSE PRACTITIONER

## 2023-04-04 PROCEDURE — 80053 COMPREHEN METABOLIC PANEL: CPT | Performed by: NURSE PRACTITIONER

## 2023-04-04 PROCEDURE — 63600175 PHARM REV CODE 636 W HCPCS: Performed by: SPECIALIST

## 2023-04-04 RX ORDER — CARVEDILOL 12.5 MG/1
12.5 TABLET ORAL 2 TIMES DAILY
Status: DISCONTINUED | OUTPATIENT
Start: 2023-04-04 | End: 2023-04-08 | Stop reason: HOSPADM

## 2023-04-04 RX ADMIN — Medication 400 MG: at 09:04

## 2023-04-04 RX ADMIN — CEFAZOLIN SODIUM 2 G: 2 SOLUTION INTRAVENOUS at 05:04

## 2023-04-04 RX ADMIN — KETOROLAC TROMETHAMINE 15 MG: 30 INJECTION, SOLUTION INTRAMUSCULAR; INTRAVENOUS at 05:04

## 2023-04-04 RX ADMIN — CARVEDILOL 12.5 MG: 12.5 TABLET, FILM COATED ORAL at 08:04

## 2023-04-04 RX ADMIN — OXYCODONE HYDROCHLORIDE 5 MG: 5 TABLET ORAL at 08:04

## 2023-04-04 RX ADMIN — KETOROLAC TROMETHAMINE 15 MG: 30 INJECTION, SOLUTION INTRAMUSCULAR; INTRAVENOUS at 11:04

## 2023-04-04 RX ADMIN — VANCOMYCIN HYDROCHLORIDE 1000 MG: 1 INJECTION, POWDER, LYOPHILIZED, FOR SOLUTION INTRAVENOUS at 08:04

## 2023-04-04 RX ADMIN — CARVEDILOL 12.5 MG: 12.5 TABLET, FILM COATED ORAL at 11:04

## 2023-04-04 RX ADMIN — DOCUSATE SODIUM 100 MG: 100 CAPSULE, LIQUID FILLED ORAL at 09:04

## 2023-04-04 RX ADMIN — FAMOTIDINE 20 MG: 20 TABLET, FILM COATED ORAL at 08:04

## 2023-04-04 RX ADMIN — MUPIROCIN 1 G: 20 OINTMENT TOPICAL at 08:04

## 2023-04-04 RX ADMIN — ACETAMINOPHEN 325MG 650 MG: 325 TABLET ORAL at 09:04

## 2023-04-04 RX ADMIN — FAMOTIDINE 20 MG: 20 TABLET, FILM COATED ORAL at 09:04

## 2023-04-04 RX ADMIN — SODIUM CHLORIDE 75 ML/HR: 4.5 INJECTION, SOLUTION INTRAVENOUS at 02:04

## 2023-04-04 RX ADMIN — DOCUSATE SODIUM 100 MG: 100 CAPSULE, LIQUID FILLED ORAL at 08:04

## 2023-04-04 RX ADMIN — KETOROLAC TROMETHAMINE 15 MG: 30 INJECTION, SOLUTION INTRAMUSCULAR; INTRAVENOUS at 10:04

## 2023-04-04 RX ADMIN — ENOXAPARIN SODIUM 40 MG: 40 INJECTION SUBCUTANEOUS at 05:04

## 2023-04-04 RX ADMIN — INSULIN ASPART 4 UNITS: 100 INJECTION, SOLUTION INTRAVENOUS; SUBCUTANEOUS at 05:04

## 2023-04-04 RX ADMIN — INSULIN ASPART 4 UNITS: 100 INJECTION, SOLUTION INTRAVENOUS; SUBCUTANEOUS at 11:04

## 2023-04-04 RX ADMIN — ACETAMINOPHEN 325MG 650 MG: 325 TABLET ORAL at 02:04

## 2023-04-04 NOTE — ANESTHESIA POSTPROCEDURE EVALUATION
Anesthesia Post Evaluation    Patient: Michelle Rodríguez    Procedure(s) Performed: Procedure(s) (LRB):  VATS (VIDEO-ASSISTED THORACOSCOPIC SURGERY) (Left)    Final Anesthesia Type: general      Patient location during evaluation: PACU  Patient participation: Yes- Able to Participate  Level of consciousness: awake  Post-procedure vital signs: reviewed and stable  Pain management: adequate  Airway patency: patent  AMARILIS mitigation strategies: Multimodal analgesia    Anesthetic complications: no      Cardiovascular status: stable  Respiratory status: unassisted  Hydration status: euvolemic  Follow-up not needed.          Vitals Value Taken Time   /76 04/03/23 1915   Temp 37 °C (98.6 °F) 04/03/23 1844   Pulse 89 04/03/23 1918   Resp 14 04/03/23 1935   SpO2 96 % 04/03/23 1918   Vitals shown include unvalidated device data.      Event Time   Out of Recovery 19:36:00         Pain/Lexy Score: Pain Rating Prior to Med Admin: 9 (4/3/2023  7:35 PM)  Lexy Score: 8 (4/3/2023  7:35 PM)

## 2023-04-04 NOTE — PROGRESS NOTES
"  [facility]  Cardiology  Progress note    Patient Name: Michelle Rodríguez  MRN: [id number]  Admission Date: 4/3/2023  Hospital Length of Stay: 1 days  Code Status: Full Code   Attending Provider: Jian Avelar Jr., MD   Consulting Provider: Ricki Colon, [facility]  Primary Care Physician: Rachid Brito, FNP  Principal Problem:<principal problem not specified>    Patient information was obtained from patient, past medical records, and ER records.     Subjective:     Chief Complaint: Reason for Consult: Device Implant    HPI: Ms. Rodríguez is a 71 y/o female who is known to [facility], [facility]. The patient presented to the ER on 4.3.23 with c/o Epigastric/CP. The patient reported that last week she had an ICD Placed and was doing well, however, on the morning of presentation she developed some CP that was located to her epigastric area and midsternal chest. The pain was rate 5/10 on a verbal scale that was described as a "burning" sensation. She did report that over the last few days she has been having a cough that was non-productive. Denied Fever and Chills. She was given Nitro Spray in route and she developed hypotension and was given IVF Bolus. She was evaluated in the ER and found to have: WBC 6.3, H&H 8.8/25.7 (Last H&H in Office [10.28.22]: 13.7/41.2), Glucose 397, .3, Troponin 0.032 and a CXR with Worsening Opacification of the LLL concerning for Infectious Process. A CT of the Chest was ordered and is pending. [facility] has been consulted since she recently had a ICD Placed.     23: Underwent Left VATS yesterday with removal of hemothorax, CT in place. States she feels much better today. SOB improved, +incisional CP. Paced on tele. 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), Tonsillectomy, , Angiogram  Family History: Father, D, 54, Cancer; Mother, D, 71, DM II; " Daughter, L, Cancer  Social History: Denies Illicit Drug, ETOH and Tobacco Use    Previous Cardiac Diagnostics:   ECHO 3.2.23:  The study quality is average.   A limited study was performed to re-evaluate the left ventricular systolic function.   Global left ventricular systolic function is moderately decreased. The left ventricular ejection fraction is 30%.     [facility] 11.21.22:  Normal Coronary Arteries    MPI 10.6.22:  This is an abnormal perfusion study.   This scan is suggestive of low to moderate risk for future cardiovascular events.   Small fixed perfusion abnormality of mild intensity in the apical segment. Small fixed perfusion abnormality of moderate intensity in the basal anteroseptal segment. Small fixed perfusion abnormality of moderate intensity in the basal inferoseptal segment.   The left ventricular cavity is noted to be normal on the stress studies. The stress left ventricular ejection fraction was calculated to be 44% and left ventricular global function is mildly reduced. The rest left ventricular cavity is noted to be mildly enlarged. The rest left ventricular ejection fraction was calculated to be 37% and rest left ventricular global function is moderately reduced.   When compared to the resting ejection fraction (37%), the stress ejection fraction (44%) has increased.   The study quality is good.   There was a rise in myocardial blood flow between rest and stress.  Global myocardial blood flow reserve was 1.74.  Myocardial blood flow reserve is globally abnormal, placing the patient at a higher coronary event risk.    Review of patient's allergies indicates:  No Known Allergies    Current Facility-Administered Medications on File Prior to Encounter   Medication    ceFAZolin injection 2 g    sodium chloride 0.9% flush 10 mL     Current Outpatient Medications on File Prior to Encounter   Medication Sig    alendronate (FOSAMAX) 70 MG tablet Take 70 mg by mouth every 7 days.    ascorbic acid, vitamin  C, 500 mg Chew Take 1 tablet by mouth once daily.    aspirin (ECOTRIN) 81 MG EC tablet Take 81 mg by mouth once daily.    atorvastatin (LIPITOR) 10 MG tablet Take 10 mg by mouth twice a week.    carvediloL (COREG) 25 MG tablet Take 25 mg by mouth 2 (two) times daily.    cholecalciferol, vitamin D3, (VITAMIN D3) 25 mcg (1,000 unit) capsule Take 1,000 Units by mouth once daily.    dulaglutide (TRULICITY) 0.75 mg/0.5 mL pen injector Inject 0.75 mg into the skin every 7 days.    ENTRESTO 49-51 mg per tablet Take 1 tablet by mouth 2 (two) times daily.    glipiZIDE (GLUCOTROL) 10 MG TR24 Take 10 mg by mouth once daily.    glucagon (BAQSIMI) 3 mg/actuation Spry 3 mg by Nasal route as needed. Takes PRN---never used at this time    LANTUS SOLOSTAR U-100 INSULIN glargine 100 units/mL SubQ pen Inject 40 Units into the skin once daily.    magnesium oxide (MAG-OX) 400 mg (241.3 mg magnesium) tablet Take 400 mg by mouth once daily.    metFORMIN (GLUCOPHAGE) 1000 MG tablet Take 2,000 mg by mouth daily with breakfast.    multivitamin-minerals-lutein (MULTIVITAMIN 50 PLUS) Tab Take 1 tablet by mouth once daily.    omega 3-dha-epa-fish oil 300-1,000 mg Cap Take 3,000 mg by mouth once daily.     Review of Systems   Constitutional:  Negative for fatigue and fever.   Respiratory:  Positive for cough. Negative for chest tightness and shortness of breath.    Cardiovascular:  Positive for chest pain. Negative for palpitations and leg swelling.   Gastrointestinal:  Positive for abdominal pain. Negative for blood in stool.   All other systems reviewed and are negative.    Objective:     Vital Signs (Most Recent):  Temp: 98.2 °F (36.8 °C) (04/04/23 0939)  Pulse: 79 (04/04/23 0724)  Resp: 20 (04/04/23 0724)  BP: 117/61 (04/04/23 0724)  SpO2: 97 % (04/04/23 0800)   Vital Signs (24h Range):  Temp:  [97.5 °F (36.4 °C)-98.8 °F (37.1 °C)] 98.2 °F (36.8 °C)  Pulse:  [64-93] 79  Resp:  [12-20] 20  SpO2:  [93 %-100 %] 97 %  BP: ()/(50-82) 117/61      Weight: 57 kg (125 lb 10.6 oz)  Body mass index is 23.74 kg/m².    SpO2: 97 %         Intake/Output Summary (Last 24 hours) at 4/4/2023 1007  Last data filed at 4/4/2023 0639  Gross per 24 hour   Intake 1342 ml   Output 125 ml   Net 1217 ml       Lines/Drains/Airways       Drain  Duration                  Chest Tube 04/03/23 1807 Tube - 1 Left Midaxillary 28 Fr. <1 day         Urethral Catheter 04/03/23 1710 Silicone 16 Fr. <1 day              Arterial Line  Duration             Arterial Line 04/03/23 1641 Other (Comment) <1 day              Peripheral Intravenous Line  Duration                  Peripheral IV - Single Lumen 04/03/23 1600 18 G Anterior;Proximal;Right Forearm <1 day         Peripheral IV - Single Lumen 04/03/23 1607 <1 day                  Significant Labs:  Recent Results (from the past 72 hour(s))   Comprehensive metabolic panel    Collection Time: 04/03/23  7:02 AM   Result Value Ref Range    Sodium Level 136 136 - 145 mmol/L    Potassium Level 5.0 3.5 - 5.1 mmol/L    Chloride 108 (H) 98 - 107 mmol/L    Carbon Dioxide 23 23 - 31 mmol/L    Glucose Level 397 (H) 82 - 115 mg/dL    Blood Urea Nitrogen 12.8 9.8 - 20.1 mg/dL    Creatinine 0.78 0.55 - 1.02 mg/dL    Calcium Level Total 8.5 8.4 - 10.2 mg/dL    Protein Total 4.8 (L) 5.8 - 7.6 gm/dL    Albumin Level 2.9 (L) 3.4 - 4.8 g/dL    Globulin 1.9 (L) 2.4 - 3.5 gm/dL    Albumin/Globulin Ratio 1.5 1.1 - 2.0 ratio    Bilirubin Total 0.5 <=1.5 mg/dL    Alkaline Phosphatase 25 (L) 40 - 150 unit/L    Alanine Aminotransferase 19 0 - 55 unit/L    Aspartate Aminotransferase 17 5 - 34 unit/L    eGFR >60 mls/min/1.73/m2   Brain natriuretic peptide    Collection Time: 04/03/23  7:02 AM   Result Value Ref Range    Natriuretic Peptide 141.3 (H) <=100.0 pg/mL   Troponin I    Collection Time: 04/03/23  7:02 AM   Result Value Ref Range    Troponin-I 0.032 0.000 - 0.045 ng/mL   CBC with Differential    Collection Time: 04/03/23  7:02 AM   Result Value Ref Range     WBC 6.3 4.5 - 11.5 x10(3)/mcL    RBC 2.69 (L) 4.20 - 5.40 x10(6)/mcL    Hgb 8.8 (L) 12.0 - 16.0 g/dL    Hct 25.7 (L) 37.0 - 47.0 %    MCV 95.5 (H) 80.0 - 94.0 fL    MCH 32.7 (H) 27.0 - 31.0 pg    MCHC 34.2 33.0 - 36.0 g/dL    RDW 13.0 11.5 - 17.0 %    Platelet 141 130 - 400 x10(3)/mcL    MPV 11.3 (H) 7.4 - 10.4 fL    Neut % 68.7 %    Lymph % 21.5 %    Mono % 7.6 %    Eos % 1.4 %    Basophil % 0.3 %    Lymph # 1.36 0.6 - 4.6 x10(3)/mcL    Neut # 4.34 2.1 - 9.2 x10(3)/mcL    Mono # 0.48 0.1 - 1.3 x10(3)/mcL    Eos # 0.09 0 - 0.9 x10(3)/mcL    Baso # 0.02 0 - 0.2 x10(3)/mcL    IG# 0.03 0 - 0.04 x10(3)/mcL    IG% 0.5 %    NRBC% 0.0 %   Lipase    Collection Time: 04/03/23  7:02 AM   Result Value Ref Range    Lipase Level 17 <=60 U/L   Protime-INR    Collection Time: 04/03/23  7:13 AM   Result Value Ref Range    PT 15.0 (H) 12.5 - 14.5 seconds    INR 1.20 0.00 - 1.30   POCT glucose    Collection Time: 04/03/23  7:16 AM   Result Value Ref Range    POCT Glucose 383 (H) 70 - 110 mg/dL   Type & Screen    Collection Time: 04/03/23  7:19 AM   Result Value Ref Range    Group & Rh A NEG     Indirect Aisha GEL NEG     Specimen Outdate 04/06/2023 23:59    Prepare RBC 2 Units; emergency    Collection Time: 04/03/23  7:19 AM   Result Value Ref Range    UNIT NUMBER [id number]     UNIT ABO/RH A NEG     DISPENSE STATUS Transfused     Unit Expiration [id number]     Product Code C6904G54     Unit Blood Type Code 0600     CROSSMATCH INTERPRETATION Compatible     UNIT NUMBER [id number]     UNIT ABO/RH A NEG     DISPENSE STATUS Transfused     Unit Expiration [id number]     Product Code I5555E64     Unit Blood Type Code 0600     CROSSMATCH INTERPRETATION Compatible    Prepare RBC 2 Units; to keep ahead for surgery    Collection Time: 04/03/23  7:19 AM   Result Value Ref Range    UNIT NUMBER [id number]     UNIT ABO/RH A NEG     DISPENSE STATUS Selected     Unit Expiration [id number]     Product Code A5570K82     Unit Blood Type  Code 0600     CROSSMATCH INTERPRETATION Compatible     UNIT NUMBER [id number]     UNIT ABO/RH A NEG     DISPENSE STATUS Selected     Unit Expiration [id number]     Product Code [id number]     Unit Blood Type Code 0600     CROSSMATCH INTERPRETATION Compatible    COVID/FLU A&B PCR    Collection Time: 04/03/23  7:22 AM   Result Value Ref Range    Influenza A PCR Not Detected Not Detected    Influenza B PCR Not Detected Not Detected    SARS-CoV-2 PCR Not Detected Not Detected, Negative, Invalid   Blood culture #2 **CANNOT BE ORDERED STAT**    Collection Time: 04/03/23  7:56 AM    Specimen: Blood   Result Value Ref Range    CULTURE, BLOOD ([facility]) No Growth At 24 Hours    Blood culture #1 **CANNOT BE ORDERED STAT**    Collection Time: 04/03/23  7:56 AM    Specimen: Blood   Result Value Ref Range    CULTURE, BLOOD ([facility]) No Growth At 24 Hours    ABORH RETYPE    Collection Time: 04/03/23  8:41 AM   Result Value Ref Range    ABORH Retype A NEG    Echo Saline Bubble? No    Collection Time: 04/03/23 12:52 PM   Result Value Ref Range    BSA 1.58 m2    EF 50 %    TAPSE 1.87 cm    MV mean gradient 2 mmHg    E/A ratio 0.75     E wave deceleration time 174.00 msec    MV peak gradient 4 mmHg    MV Peak E Ander 0.75 m/s    TR Max Ander 3.24 m/s    MV VTI 15.1 cm    MV Peak A Ander 1.00 m/s    Triscuspid Valve Regurgitation Peak Gradient 42 mmHg   POCT glucose    Collection Time: 04/03/23  3:32 PM   Result Value Ref Range    POCT Glucose 315 (H) 70 - 110 mg/dL   POCT glucose    Collection Time: 04/03/23  4:18 PM   Result Value Ref Range    POCT Glucose 344 (H) 70 - 110 mg/dL   POCT ARTERIAL BLOOD GAS    Collection Time: 04/03/23  5:01 PM   Result Value Ref Range    POC PH 7.41 7.35 - 7.45    POC PCO2 41 35 - 45 mmHg    POC PO2 58 (A) 80 - 100 mmHg    POC HEMOGLOBIN 9.7 (A) 12 - 16 g/dL    POC SATURATED O2 90.0 %    POC O2Hb 90.2 (A) 94.0 - 97.0 %    POC COHb 3.0 %    POC MetHb 0.60 0.40 - 1.5 %    POC Potassium 4.1 3.5 - 5.0 mmol/l    POC  Sodium 133 (A) 137 - 145 mmol/l    POC Ionized Calcium 1.17 1.12 - 1.23 mmol/l    Correct Temperature (PH) 7.41 7.35 - 7.45    Corrected Temperature (pCO2) 41 35 - 45 mmHg    Corrected Temperature (pO2) 58 (A) 80 - 100 mmHg    POC HCO3 26.0 22.0 - 26.0 mmol/l    Base Deficit 1.2 -2.0 - 2.0 mmol/l    POC Temp 37.0 °C    Specimen source Arterial sample    CBC with Differential    Collection Time: 04/03/23 11:12 PM   Result Value Ref Range    WBC 9.6 4.5 - 11.5 x10(3)/mcL    RBC 2.39 (L) 4.20 - 5.40 x10(6)/mcL    Hgb 7.7 (L) 12.0 - 16.0 g/dL    Hct 21.3 (L) 37.0 - 47.0 %    MCV 89.1 80.0 - 94.0 fL    MCH 32.2 (H) 27.0 - 31.0 pg    MCHC 36.2 (H) 33.0 - 36.0 g/dL    RDW 16.1 11.5 - 17.0 %    Platelet 114 (L) 130 - 400 x10(3)/mcL    MPV 11.1 (H) 7.4 - 10.4 fL    Neut % 84.1 %    Lymph % 10.7 %    Mono % 4.6 %    Eos % 0.0 %    Basophil % 0.2 %    Lymph # 1.03 0.6 - 4.6 x10(3)/mcL    Neut # 8.09 2.1 - 9.2 x10(3)/mcL    Mono # 0.44 0.1 - 1.3 x10(3)/mcL    Eos # 0.00 0 - 0.9 x10(3)/mcL    Baso # 0.02 0 - 0.2 x10(3)/mcL    IG# 0.04 0 - 0.04 x10(3)/mcL    IG% 0.4 %    NRBC% 0.0 %   Comprehensive Metabolic Panel    Collection Time: 04/04/23  3:50 AM   Result Value Ref Range    Sodium Level 135 (L) 136 - 145 mmol/L    Potassium Level 4.8 3.5 - 5.1 mmol/L    Chloride 106 98 - 107 mmol/L    Carbon Dioxide 26 23 - 31 mmol/L    Glucose Level 236 (H) 82 - 115 mg/dL    Blood Urea Nitrogen 15.3 9.8 - 20.1 mg/dL    Creatinine 0.75 0.55 - 1.02 mg/dL    Calcium Level Total 8.8 8.4 - 10.2 mg/dL    Protein Total 4.7 (L) 5.8 - 7.6 gm/dL    Albumin Level 3.2 (L) 3.4 - 4.8 g/dL    Globulin 1.5 (L) 2.4 - 3.5 gm/dL    Albumin/Globulin Ratio 2.1 (H) 1.1 - 2.0 ratio    Bilirubin Total 0.8 <=1.5 mg/dL    Alkaline Phosphatase 22 (L) 40 - 150 unit/L    Alanine Aminotransferase 14 0 - 55 unit/L    Aspartate Aminotransferase 17 5 - 34 unit/L    eGFR >60 mls/min/1.73/m2   Magnesium    Collection Time: 04/04/23  3:50 AM   Result Value Ref Range     Magnesium Level 1.60 1.60 - 2.60 mg/dL   CBC with Differential    Collection Time: 04/04/23  3:50 AM   Result Value Ref Range    WBC 13.2 (H) 4.5 - 11.5 x10(3)/mcL    RBC 2.80 (L) 4.20 - 5.40 x10(6)/mcL    Hgb 8.8 (L) 12.0 - 16.0 g/dL    Hct 24.9 (L) 37.0 - 47.0 %    MCV 88.9 80.0 - 94.0 fL    MCH 31.4 (H) 27.0 - 31.0 pg    MCHC 35.3 33.0 - 36.0 g/dL    RDW 16.2 11.5 - 17.0 %    Platelet 140 130 - 400 x10(3)/mcL    MPV 11.1 (H) 7.4 - 10.4 fL    Neut % 71.8 %    Lymph % 19.7 %    Mono % 8.0 %    Eos % 0.0 %    Basophil % 0.2 %    Lymph # 2.59 0.6 - 4.6 x10(3)/mcL    Neut # 9.44 (H) 2.1 - 9.2 x10(3)/mcL    Mono # 1.05 0.1 - 1.3 x10(3)/mcL    Eos # 0.00 0 - 0.9 x10(3)/mcL    Baso # 0.03 0 - 0.2 x10(3)/mcL    IG# 0.04 0 - 0.04 x10(3)/mcL    IG% 0.3 %    NRBC% 0.0 %   Hemoglobin and Hematocrit    Collection Time: 04/04/23  8:13 AM   Result Value Ref Range    Hgb 8.6 (L) 12.0 - 16.0 g/dL    Hct 25.1 (L) 37.0 - 47.0 %     Significant Imaging:  Imaging Results               CTA Chest Aorta Non Coronary (Final result)  Result time 04/03/23 15:35:12      Final result by Chris Joaquin MD (04/03/23 15:35:12)                   Narrative:    EXAMINATION  CTA CHEST AORTA NON CORONARY    CLINICAL HISTORY  possible active lung bleed, hemothorax;    TECHNIQUE  Pre- and post-contrast helical-acquisition CT images were obtained; imaging timed to coincide with arterial opacification for purpose of CT angiography.  Multiplanar reconstructions, to include MIP and volume-rendered (3D) images, were accomplished by a CT technologist at a separate workstation and pushed to PACS for physician review.    CONTRAST  IV: ISOVUE-370, 100 mL    COMPARISON  Routine post-contrast chest CT performed earlier the same day.    FINDINGS  Images were reviewed in soft tissue, lung, and bone windows.    Exam quality: adequate for evaluation    Lines/tubes: No interval change.    Previously described massive heterogeneous left pleural effusion consistent  with hemothorax again noted.  Areas of lingular hyperdensity concerning for potential contrast extravasation due to active hemorrhage or less pronounced.  However, there is a faint focus of hyperdensity on the initial post-contrast acquisition that is not readily appreciated on the pre-contrast imaging and suspected to represent minimal localized bleeding arising from the anterior left 5th intercostal artery (series 32, images 252-254; series 28, images 9-12; series 51, images 44-45).  No significant enlargement or other interval changes are appreciated on the delayed acquisition.    No other sites of abnormal pleural opacities suggestive of additional active hemorrhage appreciated.  There is no focal contour irregularity of the myometrium or pericardium, and no significant worsening of pericardial fluid.  The large mediastinal vessels are unchanged in comparison.  No new or worsened short interval changes of the right hemithorax, thoracic wall soft tissues and osseous structures, or included upper abdomen appreciated.    IMPRESSION  1. No significant short interval change of the large left hemothorax volume and adjacent lung parenchymal abnormalities.  2. Persistent, but less pronounced focus of contrast blush neck may arrive from the anterior left 5th intercostal artery but is otherwise again of indeterminate source.  3. Remaining findings unchanged.  ==========    This report was flagged in Epic as abnormal.    RADIATION DOSE  Automated tube current modulation, weight-based exposure dosing, and/or iterative reconstruction technique utilized to reach lowest reasonably achievable exposure rate.    DLP: 654 mGy*cm      Electronically signed by: Chris Joaquin  Date:    04/03/2023  Time:    15:35                                      CT Chest With Contrast (Final result)  Result time 04/03/23 11:54:54      Final result by Chris Joaquin MD (04/03/23 11:54:54)                   Narrative:    EXAMINATION  CT CHEST WITH  CONTRAST    CLINICAL HISTORY  Pneumonia, unresolved;    TECHNIQUE  Post-contrast helical-acquisition CT images were obtained and multiplanar reformats accomplished by a CT technologist at a separate workstation, pushed to PACS for physician review.    CONTRAST  IV: ISOVUE-370, 100 mL    COMPARISON  *No prior cross-sectional imaging is available for direct comparison.  *Radiographs obtained earlier the same day were reviewed.    FINDINGS  Images were reviewed in lung, soft tissue, and bone windows.    Exam quality: adequate for evaluation    Lines/tubes: Left chest wall pacemaker and associated leads are again appreciated.    Heart chambers are mildly prominent volume by overall visual appearance.  No significant pericardial fluid is identified.  Scattered aortic mural calcification is present, as well as calcific densities at the arch and upper abdominal branch vessels.  No focal vessel contour irregularity or aneurysmal dilatation is identified.    Central airways are widely patent.  Irregular ground-glass and more consolidative airspace densities are scattered through the left upper lobe.  The left lower lobe is completely compressed by large volume layering pleural fluid.  Overall fluid is heterogeneous, with irregular areas of increased attenuation, as well as several focal hyperdensities in the region of the lingula that are similar attenuation to intraluminal contrast (series 2, images 65-75).  Differentiation of potential hematoma versus compressed lung is difficult through this region, but there is expected complete loss of airspace volume due to compression involving the left lower lobe.  No clear source for potential active bleeding.  The right lung is predominantly clear, with no significant pleural fluid identified.  There is no pneumothorax.    No pathologic lymph node enlargement or necrotic adenopathy is evident.  Thyroid gland is normal for CT assessment.  The visualized upper abdominal structures  are without evidence of acute or suspicious focal process.  Both kidneys enhance in temporally symmetric fashion.    There is no focal abnormality of the thoracic wall subcutaneous tissues.  Regional musculature is unremarkable.  No acute osseous displacement or destructive skeletal lesion is evident.  Degenerative alterations are present throughout the visualized spinal column and both shoulders.    IMPRESSION  1. Large volume left hemothorax, with question of possible contrast blush due to active hemorrhage at the region of the lingula.  Given overall location and injury orientation, this could represent sequela of recent procedure such is pericardiocentesis with resulting anterior intercostal artery injury.  Otherwise, definite source of suspected hemorrhage is not clearly discernible.  2. Complete compressive atelectasis of the left lower lobe, with scattered irregular ground-glass and more consolidative airspace densities through the mildly compressed left upper lobe.  3. No other evidence of acute or suspicious focal intrathoracic abnormality.  Additional secondary details discussed above.  ==========    The above findings were discussed via telephone with Dr. Cohn (Emergency Dept) prior to completion of the final report (4/3/2023; 11:19).    This report was flagged in Epic as abnormal.    RADIATION DOSE  Automated tube current modulation, weight-based exposure dosing, and/or iterative reconstruction technique utilized to reach lowest reasonably achievable exposure rate.    DLP: 163 mGy*cm      Electronically signed by: Chris Joaquin  Date:    04/03/2023  Time:    11:54                                     X-Ray Chest 1 View (Final result)  Result time 04/03/23 07:26:47   Procedure changed from X-Ray Chest PA And Lateral     Final result by Pierce Zamarripa MD (04/03/23 07:26:47)                   Impression:      Worsening opacification of the left lower lobe concerning for infectious  process.      Electronically signed by: Pierce Zamarripa  Date:    04/03/2023  Time:    07:26               Narrative:    EXAMINATION:  XR CHEST 1 VIEW    CLINICAL HISTORY:  Chest Pain;    TECHNIQUE:  Single view of the chest    COMPARISON:  03/23/2023    FINDINGS:  Interval development of left lower lobe opacification with associated left effusion.  The right hemithorax is clear.                                    EKG:       Telemetry: AV Paced    Physical Exam  Constitutional:       Appearance: Normal appearance.   HENT:      Head: Normocephalic.      Mouth/Throat:      Mouth: Mucous membranes are moist.   Eyes:      Extraocular Movements: Extraocular movements intact.   Cardiovascular:      Rate and Rhythm: Normal rate and regular rhythm.      Pulses: Normal pulses.   Pulmonary:      Effort: Pulmonary effort is normal.      Breath sounds: Examination of the left-lower field reveals decreased breath sounds. Decreased breath sounds present.   Abdominal:      Palpations: Abdomen is soft.   Musculoskeletal:         General: No swelling. Normal range of motion.   Skin:     General: Skin is warm and dry.      Coloration: Skin is pale.      Comments: L CW Pacer Site with Dermabond Intact, No Sign of Bleed/Infection; Mild Ecchymosis to Incision Site    L CT in Place   Neurological:      General: No focal deficit present.      Mental Status: She is alert and oriented to person, place, and time.   Psychiatric:         Mood and Affect: Mood normal.         Behavior: Behavior normal.     Home Medications:   Current Facility-Administered Medications on File Prior to Encounter   Medication Dose Route Frequency Provider Last Rate Last Admin    ceFAZolin injection 2 g  2 g Intravenous On Call Procedure Demario Childress MD   2 g at 03/23/23 1417    sodium chloride 0.9% flush 10 mL  10 mL Intravenous PRN Demario Childress MD         Current Outpatient Medications on File Prior to Encounter   Medication Sig Dispense Refill    alendronate  (FOSAMAX) 70 MG tablet Take 70 mg by mouth every 7 days.      ascorbic acid, vitamin C, 500 mg Chew Take 1 tablet by mouth once daily.      aspirin (ECOTRIN) 81 MG EC tablet Take 81 mg by mouth once daily.      atorvastatin (LIPITOR) 10 MG tablet Take 10 mg by mouth twice a week.      carvediloL (COREG) 25 MG tablet Take 25 mg by mouth 2 (two) times daily.      cholecalciferol, vitamin D3, (VITAMIN D3) 25 mcg (1,000 unit) capsule Take 1,000 Units by mouth once daily.      dulaglutide (TRULICITY) 0.75 mg/0.5 mL pen injector Inject 0.75 mg into the skin every 7 days.      ENTRESTO 49-51 mg per tablet Take 1 tablet by mouth 2 (two) times daily.      glipiZIDE (GLUCOTROL) 10 MG TR24 Take 10 mg by mouth once daily.      glucagon (BAQSIMI) 3 mg/actuation Spry 3 mg by Nasal route as needed. Takes PRN---never used at this time      LANTUS SOLOSTAR U-100 INSULIN glargine 100 units/mL SubQ pen Inject 40 Units into the skin once daily.      magnesium oxide (MAG-OX) 400 mg (241.3 mg magnesium) tablet Take 400 mg by mouth once daily.      metFORMIN (GLUCOPHAGE) 1000 MG tablet Take 2,000 mg by mouth daily with breakfast.      multivitamin-minerals-lutein (MULTIVITAMIN 50 PLUS) Tab Take 1 tablet by mouth once daily.      omega 3-dha-epa-fish oil 300-1,000 mg Cap Take 3,000 mg by mouth once daily.       Current Inpatient Medications:    Current Facility-Administered Medications:     0.45% NaCl infusion, 75 mL/hr, Intravenous, Continuous, Armando Dalal IV, MD, Last Rate: 75 mL/hr at 04/03/23 1930, 75 mL/hr at 04/03/23 1930    0.9%  NaCl infusion (for blood administration), , Intravenous, Q24H PRN, Sumeet Cohn IV, MD    acetaminophen tablet 650 mg, 650 mg, Oral, Q4H PRN, Armando Dalal IV, MD, 650 mg at 04/04/23 0939    atorvastatin tablet 10 mg, 10 mg, Oral, Twice Weekly, Juan C Holman, ALEJANDRO    carvediloL tablet 12.5 mg, 12.5 mg, Oral, BID, Ricki Colon, AGACNP-BC    dextrose 10% bolus 125 mL 125 mL, 12.5 g, Intravenous,  PRN, ALEJANDRO Velasquez    dextrose 10% bolus 250 mL 250 mL, 25 g, Intravenous, PRN, ALEJANDRO Velasquez    docusate sodium capsule 100 mg, 100 mg, Oral, BID, Armando Dalal IV, MD, 100 mg at 04/04/23 0957    enoxaparin injection 40 mg, 40 mg, Subcutaneous, Daily, Armando Dalal IV, MD    famotidine tablet 20 mg, 20 mg, Oral, BID, Armando Dalal IV, MD, 20 mg at 04/04/23 0957    glucagon (human recombinant) injection 1 mg, 1 mg, Intramuscular, PRN, ALEJANDRO Velasquez    glucose chewable tablet 16 g, 16 g, Oral, PRN, Juan C Holman, ALEJANDRO    glucose chewable tablet 16 g, 16 g, Oral, PRN, Juan C Holman, ALEJANDRO    glucose chewable tablet 24 g, 24 g, Oral, PRN, Juan C Holman, ALEJANDRO    glucose chewable tablet 32 g, 32 g, Oral, PRN, ALEJANDRO Velasquez    insulin aspart U-100 injection 0-5 Units, 0-5 Units, Subcutaneous, QID (AC + HS) PRN, ALEJANDRO Velasquez    insulin regular injection 4 Units 0.04 mL, 4 Units, Intravenous, Once, Jian Avelar Jr., MD    ketorolac injection 15 mg, 15 mg, Intravenous, QID, Armando Dalal IV, MD, 15 mg at 04/04/23 0508    loperamide capsule 4 mg, 4 mg, Oral, Once, Armando Dalal IV, MD    magnesium oxide tablet 400 mg, 400 mg, Oral, Daily, ALEJANDRO Velasquez, 400 mg at 04/04/23 0957    melatonin tablet 6 mg, 6 mg, Oral, Nightly PRN, Armando Dalal IV, MD    metoclopramide HCl injection 5 mg, 5 mg, Intravenous, Q6H PRN, Armando Dalal IV, MD    mupirocin 2 % ointment 1 g, 1 g, Nasal, BID, Armando Dalal IV, MD, 1 g at 04/03/23 2204    ondansetron disintegrating tablet 8 mg, 8 mg, Oral, Q8H PRN, Armando Dalal IV, MD    ondansetron injection 4 mg, 4 mg, Intravenous, Q8H PRN, Sumeet ALVAREZ Curet IV, MD    oxyCODONE immediate release tablet 5 mg, 5 mg, Oral, Q4H PRN, Armando Dalal IV, MD    sodium chloride 0.9% flush 10 mL, 10 mL, Intravenous, PRN, Sumeet ALVAREZ Curet IV, MD    vancomycin in dextrose 5 % 1 gram/250 mL IVPB 1,000 mg, 1,000 mg, Intravenous, Q24H, Armando Dalal IV, MD,  Stopped at 04/03/23 2334    Facility-Administered Medications Ordered in Other Encounters:     ceFAZolin injection 2 g, 2 g, Intravenous, On Call Procedure, Demario Childress MD, 2 g at 03/23/23 1417    sodium chloride 0.9% flush 10 mL, 10 mL, Intravenous, PRN, Demario Childress MD    VTE Risk Mitigation (From admission, onward)           Ordered     enoxaparin injection 40 mg  Daily         04/03/23 1816     IP VTE HIGH RISK PATIENT  Once         04/03/23 1816     Place sequential compression device  Until discontinued         04/03/23 1816     Place sequential compression device  Until discontinued         04/03/23 1451                  Assessment:     See below MDM formulated by me (Tab Turner MD):    L Sided Hemothorax/L VATS and CT placement  Abnormal Radiographic Study    - CXR (4.3.23) - Worsening Opacification of the LLL concerning for Infectious Process    - CT of Chest (4.3.23) - Large volume left hemothorax, with question of possible contrast blush due to active hemorrhage at the region of the lingula.  Given overall location and injury orientation, this could represent sequela of recent procedure such is pericardiocentesis with resulting anterior intercostal artery injury.  Otherwise, definite source of suspected hemorrhage is not clearly discernible. Complete compressive atelectasis of the left lower lobe, with scattered irregular ground-glass and more consolidative airspace densities through the mildly compressed left upper lobe.    - CTA Chest (4.3.23) - No significant short interval change of the large left hemothorax volume and adjacent lung parenchymal abnormalities. Persistent, but less pronounced focus of contrast blush neck may arrive from the anterior left 5th intercostal artery but is otherwise again of indeterminate source.  Anemia (Acute Blood Loss)    - H&H (4.3.23) - 8.8/25.7    - H&H (10.28.22) - 13.7/41.2  Hypotension s/p Nitro Sprays  NICMO/EF 30%    - s/p BiV CRT-D Implant (3.23.23) - [facility]  Normal Coronaries  Hx of HTN  DM II  Hx of LBBB  Bilateral DONALD/Mild  Vertigo    Plan:   Resume Coreg today but at lower dose (12.5mg BID)  Will resume Entresto if BP reamins stable   Hold Anticoagulants/Antiplatelets  CT Management per [facility]  Labs in AM: CBC, CMP and Mg      JULIO CESAR Amaya-BC and Tab Turner MD  Cardiology  [facility]  04/04/2023

## 2023-04-04 NOTE — PROGRESS NOTES
CT SURGERY PROGRESS NOTE  Michelle Rodríguez  72 y.o.  1950    Patients Procedure: Procedure(s) (LRB):  VATS (VIDEO-ASSISTED THORACOSCOPIC SURGERY) (Left)    Subjective  Interval History: POD 1 left VATS     72-year-old female with a history of cardiomyopathy recently had an AICD placed  23rd of March.  She now has shortness of breath and anemia.  A CT scan of the chest reveals a large left hemothorax.  The patient's blood pressure has been stable.  She will need evacuation of this left hemothorax.  Hopefully we can do this through a thoracoscopic approach.  It may need to be opened depending on the extent of the clot.  I have discussed the risks and benefits in detail with the patient and her family.  They understand and would like to proceed.  Past surgical history significant for AICD     ROS    Medication List  Infusions   sodium chloride 0.45% 75 mL/hr (04/03/23 1930)     Scheduled   atorvastatin  10 mg Oral Twice Weekly    carvediloL  12.5 mg Oral BID    docusate sodium  100 mg Oral BID    enoxaparin  40 mg Subcutaneous Daily    famotidine  20 mg Oral BID    insulin regular  4 Units Intravenous Once    ketorolac  15 mg Intravenous QID    loperamide  4 mg Oral Once    magnesium oxide  400 mg Oral Daily    mupirocin  1 g Nasal BID    vancomycin (VANCOCIN) IVPB  1,000 mg Intravenous Q24H       Objective:  Recent Vitals:  Temp:  [97.5 °F (36.4 °C)-98.8 °F (37.1 °C)] 98.2 °F (36.8 °C)  Pulse:  [64-93] 79  Resp:  [12-20] 20  SpO2:  [93 %-100 %] 97 %  BP: ()/(50-82) 117/61    Physical Exam     I/O last 24 hrs:  Intake/Output - Last 3 Shifts         04/02 0700  04/03 0659 04/03 0700  04/04 0659 04/04 0700  04/05 0659    I.V. (mL/kg)  100 (1.8)     Blood  342     IV Piggyback  900     Total Intake(mL/kg)  1342 (23.5)     Chest Tube  125     Total Output  125     Net  +1217                    Labs  BMP:   Recent Labs   Lab 04/04/23  0350   *   K 4.8   CO2 26   BUN 15.3   CREATININE 0.75   CALCIUM 8.8   MG  1.60     CBC:   Recent Labs   Lab 04/04/23  0350 04/04/23  0813   WBC 13.2*  --    RBC 2.80*  --    HGB 8.8* 8.6*   HCT 24.9* 25.1*     --    MCV 88.9  --    MCH 31.4*  --    MCHC 35.3  --      CMP:   Recent Labs   Lab 04/04/23  0350   CALCIUM 8.8   ALBUMIN 3.2*   *   K 4.8   CO2 26   BUN 15.3   CREATININE 0.75   ALKPHOS 22*   ALT 14   AST 17   BILITOT 0.8         Imaging:   CXR: X-Ray Chest AP Single View    Result Date: 4/4/2023  Improved aeration of the bilateral lungs.  Left-sided thoracostomy tube remains in place. Electronically signed by: Pierce Zamarripa Date:    04/04/2023 Time:    07:13    X-Ray Chest AP Single View    Result Date: 4/3/2023  Left chest tube placement with smaller left pleural effusion.  Suspect pulmonary edema. Electronically signed by: Cassius Parson Date:    04/03/2023 Time:    19:37         ASSESSMENT/PLAN:    Cont chest tube  Mobilize  DC planning     Case and plan of care discussed with MD Jian Carvalho PA-C

## 2023-04-04 NOTE — NURSING
Nurses Note -- 4 Eyes      4/3/23   11:33 PM      Skin assessed during: Admit      [x] No Pressure Injuries Present    []Prevention Measures Documented      [] Yes- Altered Skin Integrity Present or Discovered   [x] LDA Added if Not in Epic (Describe Wound)   [] New Altered Skin Integrity was Present on Admit and Documented in LDA   [] Wound Image Taken    Wound Care Consulted? No    Attending Nurse:  Macrina Loja RN     Second RN/Staff Member:  BRUNO NAVARRETE RN

## 2023-04-04 NOTE — PLAN OF CARE
Referral sent to [facility] for Home Health Services.     04/04/23 1241   Discharge Assessment   Assessment Type Discharge Planning Assessment   Confirmed/corrected address, phone number and insurance Yes   Confirmed Demographics Correct on Facesheet   Source of Information patient;family   Does patient/caregiver understand observation status Yes   Communicated SOPHIA with patient/caregiver Yes;Date not available/Unable to determine   People in Home spouse   Do you expect to return to your current living situation? Yes   Prior to hospitilization cognitive status: Alert/Oriented   Current cognitive status: Alert/Oriented   Equipment Currently Used at Home none   Readmission within 30 days? No   Do you currently have service(s) that help you manage your care at home? No   Do you take prescription medications? Yes   Do you have prescription coverage? Yes   Coverage [employer] Medicare   Who is going to help you get home at discharge? Family will take home.   How do you get to doctors appointments? car, drives self;family or friend will provide   Discharge Plan A Home Health   Discharge Plan B Home Health   Discharge Plan discussed with: Patient;Spouse/sig other;Adult children   Name(s) and Number(s) Rubin Rodríguez  Spouse  [phone]

## 2023-04-05 LAB
ANION GAP SERPL CALC-SCNC: 5 MEQ/L
BUN SERPL-MCNC: 7.8 MG/DL (ref 9.8–20.1)
CALCIUM SERPL-MCNC: 8.1 MG/DL (ref 8.4–10.2)
CHLORIDE SERPL-SCNC: 106 MMOL/L (ref 98–107)
CO2 SERPL-SCNC: 25 MMOL/L (ref 23–31)
CREAT SERPL-MCNC: 0.67 MG/DL (ref 0.55–1.02)
CREAT/UREA NIT SERPL: 12
GFR SERPLBLD CREATININE-BSD FMLA CKD-EPI: >60 MLS/MIN/1.73/M2
GLUCOSE SERPL-MCNC: 246 MG/DL (ref 82–115)
HCT VFR BLD AUTO: 24.7 % (ref 37–47)
HCT VFR BLD AUTO: 25.5 % (ref 37–47)
HCT VFR BLD AUTO: 26.5 % (ref 37–47)
HCT VFR BLD AUTO: 31.4 % (ref 37–47)
HGB BLD-MCNC: 10.7 G/DL (ref 12–16)
HGB BLD-MCNC: 8.6 G/DL (ref 12–16)
HGB BLD-MCNC: 8.7 G/DL (ref 12–16)
HGB BLD-MCNC: 9 G/DL (ref 12–16)
POCT GLUCOSE: 308 MG/DL (ref 70–110)
POCT GLUCOSE: 352 MG/DL (ref 70–110)
POTASSIUM SERPL-SCNC: 4.3 MMOL/L (ref 3.5–5.1)
SODIUM SERPL-SCNC: 136 MMOL/L (ref 136–145)

## 2023-04-05 PROCEDURE — 27000221 HC OXYGEN, UP TO 24 HOURS

## 2023-04-05 PROCEDURE — 25000003 PHARM REV CODE 250: Performed by: NURSE PRACTITIONER

## 2023-04-05 PROCEDURE — 94761 N-INVAS EAR/PLS OXIMETRY MLT: CPT

## 2023-04-05 PROCEDURE — 99024 POSTOP FOLLOW-UP VISIT: CPT | Mod: ,,, | Performed by: PHYSICIAN ASSISTANT

## 2023-04-05 PROCEDURE — 21400001 HC TELEMETRY ROOM

## 2023-04-05 PROCEDURE — 99024 PR POST-OP FOLLOW-UP VISIT: ICD-10-PCS | Mod: ,,, | Performed by: PHYSICIAN ASSISTANT

## 2023-04-05 PROCEDURE — 85014 HEMATOCRIT: CPT | Performed by: NURSE PRACTITIONER

## 2023-04-05 PROCEDURE — 80048 BASIC METABOLIC PNL TOTAL CA: CPT | Performed by: SPECIALIST

## 2023-04-05 PROCEDURE — 63600175 PHARM REV CODE 636 W HCPCS: Performed by: NURSE PRACTITIONER

## 2023-04-05 PROCEDURE — 63600175 PHARM REV CODE 636 W HCPCS: Performed by: SPECIALIST

## 2023-04-05 PROCEDURE — 25000003 PHARM REV CODE 250: Performed by: SPECIALIST

## 2023-04-05 RX ADMIN — ENOXAPARIN SODIUM 40 MG: 40 INJECTION SUBCUTANEOUS at 05:04

## 2023-04-05 RX ADMIN — KETOROLAC TROMETHAMINE 15 MG: 30 INJECTION, SOLUTION INTRAMUSCULAR; INTRAVENOUS at 05:04

## 2023-04-05 RX ADMIN — SACUBITRIL AND VALSARTAN 1 TABLET: 49; 51 TABLET, FILM COATED ORAL at 12:04

## 2023-04-05 RX ADMIN — DOCUSATE SODIUM 100 MG: 100 CAPSULE, LIQUID FILLED ORAL at 09:04

## 2023-04-05 RX ADMIN — OXYCODONE HYDROCHLORIDE 5 MG: 5 TABLET ORAL at 04:04

## 2023-04-05 RX ADMIN — CARVEDILOL 12.5 MG: 12.5 TABLET, FILM COATED ORAL at 09:04

## 2023-04-05 RX ADMIN — Medication 400 MG: at 09:04

## 2023-04-05 RX ADMIN — INSULIN ASPART 3 UNITS: 100 INJECTION, SOLUTION INTRAVENOUS; SUBCUTANEOUS at 09:04

## 2023-04-05 RX ADMIN — FAMOTIDINE 20 MG: 20 TABLET, FILM COATED ORAL at 09:04

## 2023-04-05 RX ADMIN — INSULIN ASPART 5 UNITS: 100 INJECTION, SOLUTION INTRAVENOUS; SUBCUTANEOUS at 11:04

## 2023-04-05 RX ADMIN — SACUBITRIL AND VALSARTAN 1 TABLET: 49; 51 TABLET, FILM COATED ORAL at 09:04

## 2023-04-05 RX ADMIN — MUPIROCIN 1 G: 20 OINTMENT TOPICAL at 09:04

## 2023-04-05 RX ADMIN — OXYCODONE HYDROCHLORIDE 5 MG: 5 TABLET ORAL at 09:04

## 2023-04-05 RX ADMIN — INSULIN ASPART 4 UNITS: 100 INJECTION, SOLUTION INTRAVENOUS; SUBCUTANEOUS at 05:04

## 2023-04-05 RX ADMIN — KETOROLAC TROMETHAMINE 15 MG: 30 INJECTION, SOLUTION INTRAMUSCULAR; INTRAVENOUS at 11:04

## 2023-04-05 NOTE — PROGRESS NOTES
"  [facility]  [facility]  Progress note    Patient Name: Michelle Rodríguez  MRN: [id number]  Admission Date: 4/3/2023  Hospital Length of Stay: 2 days  Code Status: Full Code   Attending Provider: Jian Avelar Jr., MD   Consulting Provider: Ricki Colon, [facility]  Primary Care Physician: Rachid Brito, FNP  Principal Problem:<principal problem not specified>    Patient information was obtained from patient, past medical records, and ER records.     Subjective:     Chief Complaint: Reason for Consult: Device Implant    HPI: Ms. Rodríguez is a 73 y/o female who is known to [facility], [facility]. The patient presented to the ER on 4.3.23 with c/o Epigastric/CP. The patient reported that last week she had an ICD Placed and was doing well, however, on the morning of presentation she developed some CP that was located to her epigastric area and midsternal chest. The pain was rate 5/10 on a verbal scale that was described as a "burning" sensation. She did report that over the last few days she has been having a cough that was non-productive. Denied Fever and Chills. She was given Nitro Spray in route and she developed hypotension and was given IVF Bolus. She was evaluated in the ER and found to have: WBC 6.3, H&H 8.8/25.7 (Last H&H in Office [10.28.22]: 13.7/41.2), Glucose 397, .3, Troponin 0.032 and a CXR with Worsening Opacification of the LLL concerning for Infectious Process. A CT of the Chest was ordered and is pending. [facility] has been consulted since she recently had a ICD Placed.     4.4.23: Underwent Left VATS yesterday with removal of hemothorax, CT in place. States she feels much better today. SOB improved, +incisional CP. Paced on tele. VSS with low normal BP  4.5.23: H&H stable. Paced on tele. VSS with BP above goal. 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), " Tonsillectomy, , Angiogram  Family History: Father, D, 54, Cancer; Mother, D, 71, DM II; Daughter, L, Cancer  Social History: Denies Illicit Drug, ETOH and Tobacco Use    Previous Cardiac Diagnostics:   ECHO 3.:  The study quality is average.   A limited study was performed to re-evaluate the left ventricular systolic function.   Global left ventricular systolic function is moderately decreased. The left ventricular ejection fraction is 30%.     LHC 11..22:  Normal Coronary Arteries    MPI 10.622:  This is an abnormal perfusion study.   This scan is suggestive of low to moderate risk for future cardiovascular events.   Small fixed perfusion abnormality of mild intensity in the apical segment. Small fixed perfusion abnormality of moderate intensity in the basal anteroseptal segment. Small fixed perfusion abnormality of moderate intensity in the basal inferoseptal segment.   The left ventricular cavity is noted to be normal on the stress studies. The stress left ventricular ejection fraction was calculated to be 44% and left ventricular global function is mildly reduced. The rest left ventricular cavity is noted to be mildly enlarged. The rest left ventricular ejection fraction was calculated to be 37% and rest left ventricular global function is moderately reduced.   When compared to the resting ejection fraction (37%), the stress ejection fraction (44%) has increased.   The study quality is good.   There was a rise in myocardial blood flow between rest and stress.  Global myocardial blood flow reserve was 1.74.  Myocardial blood flow reserve is globally abnormal, placing the patient at a higher coronary event risk.    Review of patient's allergies indicates:  No Known Allergies    Current Facility-Administered Medications on File Prior to Encounter   Medication    ceFAZolin injection 2 g    sodium chloride 0.9% flush 10 mL     Current Outpatient Medications on File Prior to Encounter   Medication Sig     alendronate (FOSAMAX) 70 MG tablet Take 70 mg by mouth every 7 days.    ascorbic acid, vitamin C, 500 mg Chew Take 1 tablet by mouth once daily.    aspirin (ECOTRIN) 81 MG EC tablet Take 81 mg by mouth once daily.    atorvastatin (LIPITOR) 10 MG tablet Take 10 mg by mouth twice a week.    carvediloL (COREG) 25 MG tablet Take 25 mg by mouth 2 (two) times daily.    cholecalciferol, vitamin D3, (VITAMIN D3) 25 mcg (1,000 unit) capsule Take 1,000 Units by mouth once daily.    dulaglutide (TRULICITY) 0.75 mg/0.5 mL pen injector Inject 0.75 mg into the skin every 7 days.    ENTRESTO 49-51 mg per tablet Take 1 tablet by mouth 2 (two) times daily.    glipiZIDE (GLUCOTROL) 10 MG TR24 Take 10 mg by mouth once daily.    glucagon (BAQSIMI) 3 mg/actuation Spry 3 mg by Nasal route as needed. Takes PRN---never used at this time    LANTUS SOLOSTAR U-100 INSULIN glargine 100 units/mL SubQ pen Inject 40 Units into the skin once daily.    magnesium oxide (MAG-OX) 400 mg (241.3 mg magnesium) tablet Take 400 mg by mouth once daily.    metFORMIN (GLUCOPHAGE) 1000 MG tablet Take 2,000 mg by mouth daily with breakfast.    multivitamin-minerals-lutein (MULTIVITAMIN 50 PLUS) Tab Take 1 tablet by mouth once daily.    omega 3-dha-epa-fish oil 300-1,000 mg Cap Take 3,000 mg by mouth once daily.     Review of Systems   Constitutional:  Negative for fatigue and fever.   Respiratory:  Positive for cough. Negative for chest tightness and shortness of breath.    Cardiovascular:  Positive for chest pain. Negative for palpitations and leg swelling.   Gastrointestinal:  Positive for abdominal pain. Negative for blood in stool.   All other systems reviewed and are negative.    Objective:     Vital Signs (Most Recent):  Temp: 97.6 °F (36.4 °C) (04/05/23 0710)  Pulse: 78 (04/05/23 0710)  Resp: 18 (04/05/23 0710)  BP: (!) 149/61 (04/05/23 0710)  SpO2: (!) 92 % (04/05/23 0710)   Vital Signs (24h Range):  Temp:  [97.6 °F (36.4 °C)-99 °F (37.2 °C)] 97.6 °F  (36.4 °C)  Pulse:  [64-80] 78  Resp:  [16-20] 18  SpO2:  [92 %-99 %] 92 %  BP: (148-171)/(52-64) 149/61     Weight: 61.8 kg (136 lb 3.9 oz)  Body mass index is 25.74 kg/m².    SpO2: (!) 92 %         Intake/Output Summary (Last 24 hours) at 4/5/2023 0937  Last data filed at 4/5/2023 0936  Gross per 24 hour   Intake 600 ml   Output 3050 ml   Net -2450 ml         Lines/Drains/Airways       Drain  Duration                  Chest Tube 04/03/23 1807 Tube - 1 Left Midaxillary 28 Fr. 1 day         Urethral Catheter 04/03/23 1710 Silicone 16 Fr. 1 day              Peripheral Intravenous Line  Duration                  Peripheral IV - Single Lumen 04/03/23 1600 18 G Anterior;Proximal;Right Forearm 1 day         Peripheral IV - Single Lumen 04/03/23 1607 1 day                  Significant Labs:  Recent Results (from the past 72 hour(s))   Comprehensive metabolic panel    Collection Time: 04/03/23  7:02 AM   Result Value Ref Range    Sodium Level 136 136 - 145 mmol/L    Potassium Level 5.0 3.5 - 5.1 mmol/L    Chloride 108 (H) 98 - 107 mmol/L    Carbon Dioxide 23 23 - 31 mmol/L    Glucose Level 397 (H) 82 - 115 mg/dL    Blood Urea Nitrogen 12.8 9.8 - 20.1 mg/dL    Creatinine 0.78 0.55 - 1.02 mg/dL    Calcium Level Total 8.5 8.4 - 10.2 mg/dL    Protein Total 4.8 (L) 5.8 - 7.6 gm/dL    Albumin Level 2.9 (L) 3.4 - 4.8 g/dL    Globulin 1.9 (L) 2.4 - 3.5 gm/dL    Albumin/Globulin Ratio 1.5 1.1 - 2.0 ratio    Bilirubin Total 0.5 <=1.5 mg/dL    Alkaline Phosphatase 25 (L) 40 - 150 unit/L    Alanine Aminotransferase 19 0 - 55 unit/L    Aspartate Aminotransferase 17 5 - 34 unit/L    eGFR >60 mls/min/1.73/m2   Brain natriuretic peptide    Collection Time: 04/03/23  7:02 AM   Result Value Ref Range    Natriuretic Peptide 141.3 (H) <=100.0 pg/mL   Troponin I    Collection Time: 04/03/23  7:02 AM   Result Value Ref Range    Troponin-I 0.032 0.000 - 0.045 ng/mL   CBC with Differential    Collection Time: 04/03/23  7:02 AM   Result Value Ref  Range    WBC 6.3 4.5 - 11.5 x10(3)/mcL    RBC 2.69 (L) 4.20 - 5.40 x10(6)/mcL    Hgb 8.8 (L) 12.0 - 16.0 g/dL    Hct 25.7 (L) 37.0 - 47.0 %    MCV 95.5 (H) 80.0 - 94.0 fL    MCH 32.7 (H) 27.0 - 31.0 pg    MCHC 34.2 33.0 - 36.0 g/dL    RDW 13.0 11.5 - 17.0 %    Platelet 141 130 - 400 x10(3)/mcL    MPV 11.3 (H) 7.4 - 10.4 fL    Neut % 68.7 %    Lymph % 21.5 %    Mono % 7.6 %    Eos % 1.4 %    Basophil % 0.3 %    Lymph # 1.36 0.6 - 4.6 x10(3)/mcL    Neut # 4.34 2.1 - 9.2 x10(3)/mcL    Mono # 0.48 0.1 - 1.3 x10(3)/mcL    Eos # 0.09 0 - 0.9 x10(3)/mcL    Baso # 0.02 0 - 0.2 x10(3)/mcL    IG# 0.03 0 - 0.04 x10(3)/mcL    IG% 0.5 %    NRBC% 0.0 %   Lipase    Collection Time: 04/03/23  7:02 AM   Result Value Ref Range    Lipase Level 17 <=60 U/L   Protime-INR    Collection Time: 04/03/23  7:13 AM   Result Value Ref Range    PT 15.0 (H) 12.5 - 14.5 seconds    INR 1.20 0.00 - 1.30   POCT glucose    Collection Time: 04/03/23  7:16 AM   Result Value Ref Range    POCT Glucose 383 (H) 70 - 110 mg/dL   Type & Screen    Collection Time: 04/03/23  7:19 AM   Result Value Ref Range    Group & Rh A NEG     Indirect Aisha GEL NEG     Specimen Outdate 04/06/2023 23:59    Prepare RBC 2 Units; emergency    Collection Time: 04/03/23  7:19 AM   Result Value Ref Range    UNIT NUMBER [id number]     UNIT ABO/RH A NEG     DISPENSE STATUS Transfused     Unit Expiration [id number]     Product Code U9617R04     Unit Blood Type Code 0600     CROSSMATCH INTERPRETATION Compatible     UNIT NUMBER [id number]     UNIT ABO/RH A NEG     DISPENSE STATUS Transfused     Unit Expiration [id number]     Product Code I0585R07     Unit Blood Type Code 0600     CROSSMATCH INTERPRETATION Compatible    Prepare RBC 2 Units; to keep ahead for surgery    Collection Time: 04/03/23  7:19 AM   Result Value Ref Range    UNIT NUMBER [id number]     UNIT ABO/RH A NEG     DISPENSE STATUS Selected     Unit Expiration [id number]     Product Code M3147E62     Unit  Blood Type Code 0600     CROSSMATCH INTERPRETATION Compatible     UNIT NUMBER [id number]     UNIT ABO/RH A NEG     DISPENSE STATUS Selected     Unit Expiration [id number]     Product Code G1696N13     Unit Blood Type Code 0600     CROSSMATCH INTERPRETATION Compatible    COVID/FLU A&B PCR    Collection Time: 04/03/23  7:22 AM   Result Value Ref Range    Influenza A PCR Not Detected Not Detected    Influenza B PCR Not Detected Not Detected    SARS-CoV-2 PCR Not Detected Not Detected, Negative, Invalid   Blood culture #2 **CANNOT BE ORDERED STAT**    Collection Time: 04/03/23  7:56 AM    Specimen: Blood   Result Value Ref Range    CULTURE, BLOOD ([facility]) No Growth At 24 Hours    Blood culture #1 **CANNOT BE ORDERED STAT**    Collection Time: 04/03/23  7:56 AM    Specimen: Blood   Result Value Ref Range    CULTURE, BLOOD ([facility]) No Growth At 24 Hours    ABORH RETYPE    Collection Time: 04/03/23  8:41 AM   Result Value Ref Range    ABORH Retype A NEG    Echo Saline Bubble? No    Collection Time: 04/03/23 12:52 PM   Result Value Ref Range    BSA 1.58 m2    EF 50 %    TAPSE 1.87 cm    MV mean gradient 2 mmHg    E/A ratio 0.75     E wave deceleration time 174.00 msec    MV peak gradient 4 mmHg    MV Peak E Ander 0.75 m/s    TR Max Ander 3.24 m/s    MV VTI 15.1 cm    MV Peak A Ander 1.00 m/s    Triscuspid Valve Regurgitation Peak Gradient 42 mmHg   POCT glucose    Collection Time: 04/03/23  3:32 PM   Result Value Ref Range    POCT Glucose 315 (H) 70 - 110 mg/dL   POCT glucose    Collection Time: 04/03/23  4:18 PM   Result Value Ref Range    POCT Glucose 344 (H) 70 - 110 mg/dL   POCT ARTERIAL BLOOD GAS    Collection Time: 04/03/23  5:01 PM   Result Value Ref Range    POC PH 7.41 7.35 - 7.45    POC PCO2 41 35 - 45 mmHg    POC PO2 58 (A) 80 - 100 mmHg    POC HEMOGLOBIN 9.7 (A) 12 - 16 g/dL    POC SATURATED O2 90.0 %    POC O2Hb 90.2 (A) 94.0 - 97.0 %    POC COHb 3.0 %    POC MetHb 0.60 0.40 - 1.5 %    POC Potassium 4.1 3.5 - 5.0  mmol/l    POC Sodium 133 (A) 137 - 145 mmol/l    POC Ionized Calcium 1.17 1.12 - 1.23 mmol/l    Correct Temperature (PH) 7.41 7.35 - 7.45    Corrected Temperature (pCO2) 41 35 - 45 mmHg    Corrected Temperature (pO2) 58 (A) 80 - 100 mmHg    POC HCO3 26.0 22.0 - 26.0 mmol/l    Base Deficit 1.2 -2.0 - 2.0 mmol/l    POC Temp 37.0 °C    Specimen source Arterial sample    CBC with Differential    Collection Time: 04/03/23 11:12 PM   Result Value Ref Range    WBC 9.6 4.5 - 11.5 x10(3)/mcL    RBC 2.39 (L) 4.20 - 5.40 x10(6)/mcL    Hgb 7.7 (L) 12.0 - 16.0 g/dL    Hct 21.3 (L) 37.0 - 47.0 %    MCV 89.1 80.0 - 94.0 fL    MCH 32.2 (H) 27.0 - 31.0 pg    MCHC 36.2 (H) 33.0 - 36.0 g/dL    RDW 16.1 11.5 - 17.0 %    Platelet 114 (L) 130 - 400 x10(3)/mcL    MPV 11.1 (H) 7.4 - 10.4 fL    Neut % 84.1 %    Lymph % 10.7 %    Mono % 4.6 %    Eos % 0.0 %    Basophil % 0.2 %    Lymph # 1.03 0.6 - 4.6 x10(3)/mcL    Neut # 8.09 2.1 - 9.2 x10(3)/mcL    Mono # 0.44 0.1 - 1.3 x10(3)/mcL    Eos # 0.00 0 - 0.9 x10(3)/mcL    Baso # 0.02 0 - 0.2 x10(3)/mcL    IG# 0.04 0 - 0.04 x10(3)/mcL    IG% 0.4 %    NRBC% 0.0 %   Comprehensive Metabolic Panel    Collection Time: 04/04/23  3:50 AM   Result Value Ref Range    Sodium Level 135 (L) 136 - 145 mmol/L    Potassium Level 4.8 3.5 - 5.1 mmol/L    Chloride 106 98 - 107 mmol/L    Carbon Dioxide 26 23 - 31 mmol/L    Glucose Level 236 (H) 82 - 115 mg/dL    Blood Urea Nitrogen 15.3 9.8 - 20.1 mg/dL    Creatinine 0.75 0.55 - 1.02 mg/dL    Calcium Level Total 8.8 8.4 - 10.2 mg/dL    Protein Total 4.7 (L) 5.8 - 7.6 gm/dL    Albumin Level 3.2 (L) 3.4 - 4.8 g/dL    Globulin 1.5 (L) 2.4 - 3.5 gm/dL    Albumin/Globulin Ratio 2.1 (H) 1.1 - 2.0 ratio    Bilirubin Total 0.8 <=1.5 mg/dL    Alkaline Phosphatase 22 (L) 40 - 150 unit/L    Alanine Aminotransferase 14 0 - 55 unit/L    Aspartate Aminotransferase 17 5 - 34 unit/L    eGFR >60 mls/min/1.73/m2   Magnesium    Collection Time: 04/04/23  3:50 AM   Result Value Ref  Range    Magnesium Level 1.60 1.60 - 2.60 mg/dL   CBC with Differential    Collection Time: 04/04/23  3:50 AM   Result Value Ref Range    WBC 13.2 (H) 4.5 - 11.5 x10(3)/mcL    RBC 2.80 (L) 4.20 - 5.40 x10(6)/mcL    Hgb 8.8 (L) 12.0 - 16.0 g/dL    Hct 24.9 (L) 37.0 - 47.0 %    MCV 88.9 80.0 - 94.0 fL    MCH 31.4 (H) 27.0 - 31.0 pg    MCHC 35.3 33.0 - 36.0 g/dL    RDW 16.2 11.5 - 17.0 %    Platelet 140 130 - 400 x10(3)/mcL    MPV 11.1 (H) 7.4 - 10.4 fL    Neut % 71.8 %    Lymph % 19.7 %    Mono % 8.0 %    Eos % 0.0 %    Basophil % 0.2 %    Lymph # 2.59 0.6 - 4.6 x10(3)/mcL    Neut # 9.44 (H) 2.1 - 9.2 x10(3)/mcL    Mono # 1.05 0.1 - 1.3 x10(3)/mcL    Eos # 0.00 0 - 0.9 x10(3)/mcL    Baso # 0.03 0 - 0.2 x10(3)/mcL    IG# 0.04 0 - 0.04 x10(3)/mcL    IG% 0.3 %    NRBC% 0.0 %   Hemoglobin and Hematocrit    Collection Time: 04/04/23  8:13 AM   Result Value Ref Range    Hgb 8.6 (L) 12.0 - 16.0 g/dL    Hct 25.1 (L) 37.0 - 47.0 %   Hemoglobin and Hematocrit    Collection Time: 04/04/23  5:55 PM   Result Value Ref Range    Hgb 8.7 (L) 12.0 - 16.0 g/dL    Hct 24.7 (L) 37.0 - 47.0 %   Hemoglobin and Hematocrit    Collection Time: 04/05/23 12:47 AM   Result Value Ref Range    Hgb 8.7 (L) 12.0 - 16.0 g/dL    Hct 24.7 (L) 37.0 - 47.0 %   Basic metabolic panel    Collection Time: 04/05/23  6:02 AM   Result Value Ref Range    Sodium Level 136 136 - 145 mmol/L    Potassium Level 4.3 3.5 - 5.1 mmol/L    Chloride 106 98 - 107 mmol/L    Carbon Dioxide 25 23 - 31 mmol/L    Glucose Level 246 (H) 82 - 115 mg/dL    Blood Urea Nitrogen 7.8 (L) 9.8 - 20.1 mg/dL    Creatinine 0.67 0.55 - 1.02 mg/dL    BUN/Creatinine Ratio 12     Calcium Level Total 8.1 (L) 8.4 - 10.2 mg/dL    Anion Gap 5.0 mEq/L    eGFR >60 mls/min/1.73/m2   Hemoglobin and Hematocrit    Collection Time: 04/05/23  8:54 AM   Result Value Ref Range    Hgb 8.6 (L) 12.0 - 16.0 g/dL    Hct 25.5 (L) 37.0 - 47.0 %     Significant Imaging:  Imaging Results               CTA Chest Aorta  Non Coronary (Final result)  Result time 04/03/23 15:35:12      Final result by Chris Joaquin MD (04/03/23 15:35:12)                   Narrative:    EXAMINATION  CTA CHEST AORTA NON CORONARY    CLINICAL HISTORY  possible active lung bleed, hemothorax;    TECHNIQUE  Pre- and post-contrast helical-acquisition CT images were obtained; imaging timed to coincide with arterial opacification for purpose of CT angiography.  Multiplanar reconstructions, to include MIP and volume-rendered (3D) images, were accomplished by a CT technologist at a separate workstation and pushed to PACS for physician review.    CONTRAST  IV: ISOVUE-370, 100 mL    COMPARISON  Routine post-contrast chest CT performed earlier the same day.    FINDINGS  Images were reviewed in soft tissue, lung, and bone windows.    Exam quality: adequate for evaluation    Lines/tubes: No interval change.    Previously described massive heterogeneous left pleural effusion consistent with hemothorax again noted.  Areas of lingular hyperdensity concerning for potential contrast extravasation due to active hemorrhage or less pronounced.  However, there is a faint focus of hyperdensity on the initial post-contrast acquisition that is not readily appreciated on the pre-contrast imaging and suspected to represent minimal localized bleeding arising from the anterior left 5th intercostal artery (series 32, images 252-254; series 28, images 9-12; series 51, images 44-45).  No significant enlargement or other interval changes are appreciated on the delayed acquisition.    No other sites of abnormal pleural opacities suggestive of additional active hemorrhage appreciated.  There is no focal contour irregularity of the myometrium or pericardium, and no significant worsening of pericardial fluid.  The large mediastinal vessels are unchanged in comparison.  No new or worsened short interval changes of the right hemithorax, thoracic wall soft tissues and osseous structures, or  included upper abdomen appreciated.    IMPRESSION  1. No significant short interval change of the large left hemothorax volume and adjacent lung parenchymal abnormalities.  2. Persistent, but less pronounced focus of contrast blush neck may arrive from the anterior left 5th intercostal artery but is otherwise again of indeterminate source.  3. Remaining findings unchanged.  ==========    This report was flagged in Epic as abnormal.    RADIATION DOSE  Automated tube current modulation, weight-based exposure dosing, and/or iterative reconstruction technique utilized to reach lowest reasonably achievable exposure rate.    DLP: 654 mGy*cm      Electronically signed by: Chris Joaquin  Date:    04/03/2023  Time:    15:35                                      CT Chest With Contrast (Final result)  Result time 04/03/23 11:54:54      Final result by Chris Joaquin MD (04/03/23 11:54:54)                   Narrative:    EXAMINATION  CT CHEST WITH CONTRAST    CLINICAL HISTORY  Pneumonia, unresolved;    TECHNIQUE  Post-contrast helical-acquisition CT images were obtained and multiplanar reformats accomplished by a CT technologist at a separate workstation, pushed to PACS for physician review.    CONTRAST  IV: ISOVUE-370, 100 mL    COMPARISON  *No prior cross-sectional imaging is available for direct comparison.  *Radiographs obtained earlier the same day were reviewed.    FINDINGS  Images were reviewed in lung, soft tissue, and bone windows.    Exam quality: adequate for evaluation    Lines/tubes: Left chest wall pacemaker and associated leads are again appreciated.    Heart chambers are mildly prominent volume by overall visual appearance.  No significant pericardial fluid is identified.  Scattered aortic mural calcification is present, as well as calcific densities at the arch and upper abdominal branch vessels.  No focal vessel contour irregularity or aneurysmal dilatation is identified.    Central airways are widely patent.   Irregular ground-glass and more consolidative airspace densities are scattered through the left upper lobe.  The left lower lobe is completely compressed by large volume layering pleural fluid.  Overall fluid is heterogeneous, with irregular areas of increased attenuation, as well as several focal hyperdensities in the region of the lingula that are similar attenuation to intraluminal contrast (series 2, images 65-75).  Differentiation of potential hematoma versus compressed lung is difficult through this region, but there is expected complete loss of airspace volume due to compression involving the left lower lobe.  No clear source for potential active bleeding.  The right lung is predominantly clear, with no significant pleural fluid identified.  There is no pneumothorax.    No pathologic lymph node enlargement or necrotic adenopathy is evident.  Thyroid gland is normal for CT assessment.  The visualized upper abdominal structures are without evidence of acute or suspicious focal process.  Both kidneys enhance in temporally symmetric fashion.    There is no focal abnormality of the thoracic wall subcutaneous tissues.  Regional musculature is unremarkable.  No acute osseous displacement or destructive skeletal lesion is evident.  Degenerative alterations are present throughout the visualized spinal column and both shoulders.    IMPRESSION  1. Large volume left hemothorax, with question of possible contrast blush due to active hemorrhage at the region of the lingula.  Given overall location and injury orientation, this could represent sequela of recent procedure such is pericardiocentesis with resulting anterior intercostal artery injury.  Otherwise, definite source of suspected hemorrhage is not clearly discernible.  2. Complete compressive atelectasis of the left lower lobe, with scattered irregular ground-glass and more consolidative airspace densities through the mildly compressed left upper lobe.  3. No other  evidence of acute or suspicious focal intrathoracic abnormality.  Additional secondary details discussed above.  ==========    The above findings were discussed via telephone with Dr. Cohn (Emergency Dept) prior to completion of the final report (4/3/2023; 11:19).    This report was flagged in Epic as abnormal.    RADIATION DOSE  Automated tube current modulation, weight-based exposure dosing, and/or iterative reconstruction technique utilized to reach lowest reasonably achievable exposure rate.    DLP: 163 mGy*cm      Electronically signed by: Chris Joaquin  Date:    04/03/2023  Time:    11:54                                     X-Ray Chest 1 View (Final result)  Result time 04/03/23 07:26:47   Procedure changed from X-Ray Chest PA And Lateral     Final result by Pierce Zamarripa MD (04/03/23 07:26:47)                   Impression:      Worsening opacification of the left lower lobe concerning for infectious process.      Electronically signed by: Pierce Zamarripa  Date:    04/03/2023  Time:    07:26               Narrative:    EXAMINATION:  XR CHEST 1 VIEW    CLINICAL HISTORY:  Chest Pain;    TECHNIQUE:  Single view of the chest    COMPARISON:  03/23/2023    FINDINGS:  Interval development of left lower lobe opacification with associated left effusion.  The right hemithorax is clear.                                    EKG:       Telemetry: AV Paced    Physical Exam  Constitutional:       Appearance: Normal appearance.   HENT:      Head: Normocephalic.      Mouth/Throat:      Mouth: Mucous membranes are moist.   Eyes:      Extraocular Movements: Extraocular movements intact.   Cardiovascular:      Rate and Rhythm: Normal rate and regular rhythm.      Pulses: Normal pulses.      Heart sounds: Normal heart sounds.   Pulmonary:      Effort: Pulmonary effort is normal.      Breath sounds: Examination of the left-lower field reveals decreased breath sounds. Decreased breath sounds present.      Comments: Diminished BS  bilat  Abdominal:      Palpations: Abdomen is soft.   Musculoskeletal:         General: No swelling. Normal range of motion.   Skin:     General: Skin is warm and dry.      Comments: L CW Pacer Site with Dermabond Intact, No Sign of Bleed/Infection; Mild Ecchymosis to Incision Site    L CT in Place   Neurological:      General: No focal deficit present.      Mental Status: She is alert and oriented to person, place, and time.   Psychiatric:         Mood and Affect: Mood normal.         Behavior: Behavior normal.     Home Medications:   Current Facility-Administered Medications on File Prior to Encounter   Medication Dose Route Frequency Provider Last Rate Last Admin    ceFAZolin injection 2 g  2 g Intravenous On Call Procedure Demario Childress MD   2 g at 03/23/23 1417    sodium chloride 0.9% flush 10 mL  10 mL Intravenous PRN Demario Childress MD         Current Outpatient Medications on File Prior to Encounter   Medication Sig Dispense Refill    alendronate (FOSAMAX) 70 MG tablet Take 70 mg by mouth every 7 days.      ascorbic acid, vitamin C, 500 mg Chew Take 1 tablet by mouth once daily.      aspirin (ECOTRIN) 81 MG EC tablet Take 81 mg by mouth once daily.      atorvastatin (LIPITOR) 10 MG tablet Take 10 mg by mouth twice a week.      carvediloL (COREG) 25 MG tablet Take 25 mg by mouth 2 (two) times daily.      cholecalciferol, vitamin D3, (VITAMIN D3) 25 mcg (1,000 unit) capsule Take 1,000 Units by mouth once daily.      dulaglutide (TRULICITY) 0.75 mg/0.5 mL pen injector Inject 0.75 mg into the skin every 7 days.      ENTRESTO 49-51 mg per tablet Take 1 tablet by mouth 2 (two) times daily.      glipiZIDE (GLUCOTROL) 10 MG TR24 Take 10 mg by mouth once daily.      glucagon (BAQSIMI) 3 mg/actuation Spry 3 mg by Nasal route as needed. Takes PRN---never used at this time      LANTUS SOLOSTAR U-100 INSULIN glargine 100 units/mL SubQ pen Inject 40 Units into the skin once daily.      magnesium oxide (MAG-OX) 400 mg (241.3  mg magnesium) tablet Take 400 mg by mouth once daily.      metFORMIN (GLUCOPHAGE) 1000 MG tablet Take 2,000 mg by mouth daily with breakfast.      multivitamin-minerals-lutein (MULTIVITAMIN 50 PLUS) Tab Take 1 tablet by mouth once daily.      omega 3-dha-epa-fish oil 300-1,000 mg Cap Take 3,000 mg by mouth once daily.       Current Inpatient Medications:    Current Facility-Administered Medications:     0.9%  NaCl infusion (for blood administration), , Intravenous, Q24H PRN, Sumeet Cohn IV, MD    acetaminophen tablet 650 mg, 650 mg, Oral, Q4H PRN, Armando Dalal IV, MD, 650 mg at 04/04/23 1428    atorvastatin tablet 10 mg, 10 mg, Oral, Twice Weekly, ALEJANDRO Velasquez    carvediloL tablet 12.5 mg, 12.5 mg, Oral, BID, Ricki Colon AGACNP-BC, 12.5 mg at 04/05/23 0914    dextrose 10% bolus 125 mL 125 mL, 12.5 g, Intravenous, PRN, ALEJANDRO Velasquez    dextrose 10% bolus 250 mL 250 mL, 25 g, Intravenous, PRN, ALEJANDRO Velasquez    docusate sodium capsule 100 mg, 100 mg, Oral, BID, Armando Dalal IV, MD, 100 mg at 04/05/23 0914    enoxaparin injection 40 mg, 40 mg, Subcutaneous, Daily, Armando Dalal IV, MD, 40 mg at 04/04/23 1710    famotidine tablet 20 mg, 20 mg, Oral, BID, Armando Dalal IV, MD, 20 mg at 04/05/23 0914    glucagon (human recombinant) injection 1 mg, 1 mg, Intramuscular, PRN, ALEJANDRO Velasquez    glucose chewable tablet 16 g, 16 g, Oral, PRN, ALEJANDRO Velasquez    glucose chewable tablet 16 g, 16 g, Oral, PRN, ALEJANDRO Velasquez    glucose chewable tablet 24 g, 24 g, Oral, PRN, ALEJANDRO Velasquez    glucose chewable tablet 32 g, 32 g, Oral, PRN, ALEJANDRO Velasquez    insulin aspart U-100 injection 0-5 Units, 0-5 Units, Subcutaneous, QID (AC + HS) PRN, Juan C Holman, ANP, 4 Units at 04/04/23 1710    insulin regular injection 4 Units 0.04 mL, 4 Units, Intravenous, Once, Jian Avelar Jr., MD    ketorolac injection 15 mg, 15 mg, Intravenous, QID, Armando Dalal IV, MD, 15 mg at  04/05/23 0519    loperamide capsule 4 mg, 4 mg, Oral, Once, Armando Dalal IV, MD    magnesium oxide tablet 400 mg, 400 mg, Oral, Daily, Juan C Holman, ANP, 400 mg at 04/05/23 0914    melatonin tablet 6 mg, 6 mg, Oral, Nightly PRN, Armando Dalal IV, MD    metoclopramide HCl injection 5 mg, 5 mg, Intravenous, Q6H PRN, Armando Dalal IV, MD    mupirocin 2 % ointment 1 g, 1 g, Nasal, BID, Armando Dalal IV, MD, 1 g at 04/05/23 0916    ondansetron disintegrating tablet 8 mg, 8 mg, Oral, Q8H PRN, Armando Dalal IV, MD    ondansetron injection 4 mg, 4 mg, Intravenous, Q8H PRN, Sumeet Cohn IV, MD    oxyCODONE immediate release tablet 5 mg, 5 mg, Oral, Q4H PRN, Armando Dalal IV, MD, 5 mg at 04/04/23 2053    sodium chloride 0.9% flush 10 mL, 10 mL, Intravenous, PRN, Sumeet Cohn IV, MD    Facility-Administered Medications Ordered in Other Encounters:     ceFAZolin injection 2 g, 2 g, Intravenous, On Call Procedure, Demario Childress MD, 2 g at 03/23/23 1417    sodium chloride 0.9% flush 10 mL, 10 mL, Intravenous, PRN, Demario Childress MD    VTE Risk Mitigation (From admission, onward)           Ordered     enoxaparin injection 40 mg  Daily         04/03/23 1816     IP VTE HIGH RISK PATIENT  Once         04/03/23 1816     Place sequential compression device  Until discontinued         04/03/23 1816     Place sequential compression device  Until discontinued         04/03/23 1451                  Assessment:     See below MDM formulated by me (Tab Turner MD):    L Sided Hemothorax/L VATS and CT placement  Anemia (Acute Blood Loss)    H/H stable  Hypotension s/p Nitro Sprays  NICMO/EF 30%    - s/p BiV CRT-D Implant (3.23.23) - [facility]  Normal Coronaries  Hx of HTN  DM II  Hx of LBBB  Bilateral DONALD/Mild  Vertigo    Plan:   Cont Coreg  Resume Entresto  Hold Anticoagulants/Antiplatelets  CT Management per [facility], discharge once she has recovered from surgery  Labs in AM: SUSANA Colon, JULIO CESAR-BC and Tab  MD Yue  Cardiology  [facility]  04/05/2023

## 2023-04-05 NOTE — PROGRESS NOTES
"Michelle Rodríguez is a 72 y.o. female patient.   1. Hemothorax on left    2. Chest pain    3. Hypotension, unspecified hypotension type    4. Nausea and vomiting, unspecified vomiting type    5. Dehydration    6. Non-ischemic cardiomyopathy      Past Medical History:   Diagnosis Date    Benign paroxysmal vertigo, bilateral     Cardiomyopathy     Carotid artery stenosis     Bilateral    Diabetes mellitus     HTN (hypertension)     Hyperlipidemia     Hypertension     Left bundle branch block      No past surgical history pertinent negatives on file.  Scheduled Meds:   atorvastatin  10 mg Oral Twice Weekly    carvediloL  12.5 mg Oral BID    docusate sodium  100 mg Oral BID    enoxaparin  40 mg Subcutaneous Daily    famotidine  20 mg Oral BID    insulin regular  4 Units Intravenous Once    ketorolac  15 mg Intravenous QID    loperamide  4 mg Oral Once    magnesium oxide  400 mg Oral Daily    mupirocin  1 g Nasal BID    sacubitriL-valsartan  1 tablet Oral BID     Continuous Infusions:  PRN Meds:sodium chloride, acetaminophen, dextrose 10%, dextrose 10%, glucagon (human recombinant), glucose, glucose, glucose, glucose, insulin aspart U-100, melatonin, metoclopramide HCl, ondansetron, ondansetron, oxyCODONE, sodium chloride 0.9%    Review of patient's allergies indicates:  No Known Allergies  There are no hospital problems to display for this patient.    Blood pressure (!) 165/74, pulse 70, temperature 98.3 °F (36.8 °C), temperature source Oral, resp. rate 18, height 5' 1" (1.549 m), weight 61.8 kg (136 lb 3.9 oz), SpO2 (!) 93 %.    Subjective:    POD #2  Awake. Alert.  Sitting up in chair    Objective:   AFVSS  Heart: RRR  Lungs: respirations nonlabored, diminished at base  Incision: c/d/i  CT: 225cc/24hrs  H/h: 8.6/25, stable    Assesment/Plan:    S/p L VATS for evacuation of hemothorax  - continue CT  - ambulate  - IS          WHITNEY Mcgowan  4/5/2023    "

## 2023-04-06 LAB
ALBUMIN SERPL-MCNC: 3 G/DL (ref 3.4–4.8)
ALBUMIN/GLOB SERPL: 1.4 RATIO (ref 1.1–2)
ALP SERPL-CCNC: 33 UNIT/L (ref 40–150)
ALT SERPL-CCNC: 16 UNIT/L (ref 0–55)
AST SERPL-CCNC: 16 UNIT/L (ref 5–34)
BASOPHILS # BLD AUTO: 0.04 X10(3)/MCL (ref 0–0.2)
BASOPHILS NFR BLD AUTO: 0.5 %
BILIRUBIN DIRECT+TOT PNL SERPL-MCNC: 0.8 MG/DL
BUN SERPL-MCNC: 5.8 MG/DL (ref 9.8–20.1)
CALCIUM SERPL-MCNC: 8.3 MG/DL (ref 8.4–10.2)
CHLORIDE SERPL-SCNC: 106 MMOL/L (ref 98–107)
CO2 SERPL-SCNC: 27 MMOL/L (ref 23–31)
CREAT SERPL-MCNC: 0.62 MG/DL (ref 0.55–1.02)
EOSINOPHIL # BLD AUTO: 0.26 X10(3)/MCL (ref 0–0.9)
EOSINOPHIL NFR BLD AUTO: 3.1 %
ERYTHROCYTE [DISTWIDTH] IN BLOOD BY AUTOMATED COUNT: 14.6 % (ref 11.5–17)
GFR SERPLBLD CREATININE-BSD FMLA CKD-EPI: >60 MLS/MIN/1.73/M2
GLOBULIN SER-MCNC: 2.1 GM/DL (ref 2.4–3.5)
GLUCOSE SERPL-MCNC: 290 MG/DL (ref 82–115)
HCT VFR BLD AUTO: 26.5 % (ref 37–47)
HCT VFR BLD AUTO: 28.2 % (ref 37–47)
HCT VFR BLD AUTO: 29.4 % (ref 37–47)
HGB BLD-MCNC: 9.3 G/DL (ref 12–16)
HGB BLD-MCNC: 9.6 G/DL (ref 12–16)
HGB BLD-MCNC: 9.8 G/DL (ref 12–16)
IMM GRANULOCYTES # BLD AUTO: 0.03 X10(3)/MCL (ref 0–0.04)
IMM GRANULOCYTES NFR BLD AUTO: 0.4 %
LYMPHOCYTES # BLD AUTO: 1.22 X10(3)/MCL (ref 0.6–4.6)
LYMPHOCYTES NFR BLD AUTO: 14.4 %
MCH RBC QN AUTO: 31.1 PG (ref 27–31)
MCHC RBC AUTO-ENTMCNC: 34 G/DL (ref 33–36)
MCV RBC AUTO: 91.3 FL (ref 80–94)
MONOCYTES # BLD AUTO: 0.67 X10(3)/MCL (ref 0.1–1.3)
MONOCYTES NFR BLD AUTO: 7.9 %
NEUTROPHILS # BLD AUTO: 6.27 X10(3)/MCL (ref 2.1–9.2)
NEUTROPHILS NFR BLD AUTO: 73.7 %
NRBC BLD AUTO-RTO: 0 %
PLATELET # BLD AUTO: 191 X10(3)/MCL (ref 130–400)
PMV BLD AUTO: 10.7 FL (ref 7.4–10.4)
POCT GLUCOSE: 417 MG/DL (ref 70–110)
POTASSIUM SERPL-SCNC: 4.3 MMOL/L (ref 3.5–5.1)
PROT SERPL-MCNC: 5.1 GM/DL (ref 5.8–7.6)
RBC # BLD AUTO: 3.09 X10(6)/MCL (ref 4.2–5.4)
SODIUM SERPL-SCNC: 139 MMOL/L (ref 136–145)
WBC # SPEC AUTO: 8.5 X10(3)/MCL (ref 4.5–11.5)

## 2023-04-06 PROCEDURE — 99024 PR POST-OP FOLLOW-UP VISIT: ICD-10-PCS | Mod: ,,, | Performed by: PHYSICIAN ASSISTANT

## 2023-04-06 PROCEDURE — 25000003 PHARM REV CODE 250: Performed by: SPECIALIST

## 2023-04-06 PROCEDURE — 25000003 PHARM REV CODE 250: Performed by: NURSE PRACTITIONER

## 2023-04-06 PROCEDURE — 85025 COMPLETE CBC W/AUTO DIFF WBC: CPT | Performed by: NURSE PRACTITIONER

## 2023-04-06 PROCEDURE — 99024 POSTOP FOLLOW-UP VISIT: CPT | Mod: ,,, | Performed by: PHYSICIAN ASSISTANT

## 2023-04-06 PROCEDURE — 21400001 HC TELEMETRY ROOM

## 2023-04-06 PROCEDURE — 85014 HEMATOCRIT: CPT | Performed by: NURSE PRACTITIONER

## 2023-04-06 PROCEDURE — 63600175 PHARM REV CODE 636 W HCPCS: Performed by: NURSE PRACTITIONER

## 2023-04-06 PROCEDURE — 80053 COMPREHEN METABOLIC PANEL: CPT | Performed by: NURSE PRACTITIONER

## 2023-04-06 PROCEDURE — 63600175 PHARM REV CODE 636 W HCPCS: Performed by: SPECIALIST

## 2023-04-06 RX ADMIN — OXYCODONE HYDROCHLORIDE 5 MG: 5 TABLET ORAL at 12:04

## 2023-04-06 RX ADMIN — ENOXAPARIN SODIUM 40 MG: 40 INJECTION SUBCUTANEOUS at 04:04

## 2023-04-06 RX ADMIN — FAMOTIDINE 20 MG: 20 TABLET, FILM COATED ORAL at 10:04

## 2023-04-06 RX ADMIN — CARVEDILOL 12.5 MG: 12.5 TABLET, FILM COATED ORAL at 10:04

## 2023-04-06 RX ADMIN — INSULIN ASPART 5 UNITS: 100 INJECTION, SOLUTION INTRAVENOUS; SUBCUTANEOUS at 04:04

## 2023-04-06 RX ADMIN — SACUBITRIL AND VALSARTAN 1 TABLET: 49; 51 TABLET, FILM COATED ORAL at 08:04

## 2023-04-06 RX ADMIN — FAMOTIDINE 20 MG: 20 TABLET, FILM COATED ORAL at 08:04

## 2023-04-06 RX ADMIN — INSULIN ASPART 5 UNITS: 100 INJECTION, SOLUTION INTRAVENOUS; SUBCUTANEOUS at 09:04

## 2023-04-06 RX ADMIN — Medication 400 MG: at 10:04

## 2023-04-06 RX ADMIN — DOCUSATE SODIUM 100 MG: 100 CAPSULE, LIQUID FILLED ORAL at 08:04

## 2023-04-06 RX ADMIN — OXYCODONE HYDROCHLORIDE 5 MG: 5 TABLET ORAL at 06:04

## 2023-04-06 RX ADMIN — DOCUSATE SODIUM 100 MG: 100 CAPSULE, LIQUID FILLED ORAL at 10:04

## 2023-04-06 RX ADMIN — CARVEDILOL 12.5 MG: 12.5 TABLET, FILM COATED ORAL at 08:04

## 2023-04-06 RX ADMIN — OXYCODONE HYDROCHLORIDE 5 MG: 5 TABLET ORAL at 05:04

## 2023-04-06 NOTE — PROGRESS NOTES
"Michelle Rodríguez is a 72 y.o. female patient.   1. Hemothorax on left    2. Chest pain    3. Hypotension, unspecified hypotension type    4. Nausea and vomiting, unspecified vomiting type    5. Dehydration    6. Non-ischemic cardiomyopathy      Past Medical History:   Diagnosis Date    Benign paroxysmal vertigo, bilateral     Cardiomyopathy     Carotid artery stenosis     Bilateral    Diabetes mellitus     HTN (hypertension)     Hyperlipidemia     Hypertension     Left bundle branch block      No past surgical history pertinent negatives on file.  Scheduled Meds:   atorvastatin  10 mg Oral Twice Weekly    carvediloL  12.5 mg Oral BID    docusate sodium  100 mg Oral BID    enoxaparin  40 mg Subcutaneous Daily    famotidine  20 mg Oral BID    insulin regular  4 Units Intravenous Once    loperamide  4 mg Oral Once    magnesium oxide  400 mg Oral Daily    sacubitriL-valsartan  1 tablet Oral BID     Continuous Infusions:  PRN Meds:sodium chloride, acetaminophen, dextrose 10%, dextrose 10%, glucagon (human recombinant), glucose, glucose, glucose, glucose, insulin aspart U-100, melatonin, metoclopramide HCl, ondansetron, ondansetron, oxyCODONE, sodium chloride 0.9%    Review of patient's allergies indicates:  No Known Allergies  There are no hospital problems to display for this patient.    Blood pressure 115/66, pulse 88, temperature 98.5 °F (36.9 °C), temperature source Oral, resp. rate 18, height 5' 1" (1.549 m), weight 58.9 kg (129 lb 12.8 oz), SpO2 95 %.    Subjective:    POD #3  Awake. Alert.  Sitting up in chair     Objective:   AFVSS  Heart: RRR  Lungs: respirations nonlabored, clear  Incision: c/d/i  CT: 370cc/24hrs  Hct: 28, improving     Assesment/Plan:    S/p L VATS for evacuation of hemothorax  - continue CT, watersealed  - ambulate  - IS           WHITNEY Mcgowan  4/6/2023    "

## 2023-04-06 NOTE — PROGRESS NOTES
" [facility]  Cardiology  Progress note    Patient Name: Michelle Rodríguez  MRN: [id number]  Admission Date: 4/3/2023  Hospital Length of Stay: 3 days  Code Status: Full Code   Attending Provider: Jian Avelar Jr., MD   Consulting Provider: Tab Turner MD  Primary Care Physician: MIKE Mendoza  Principal Problem:<principal problem not specified>    Patient information was obtained from patient, past medical records, and ER records.     Subjective:     Chief Complaint: Reason for Consult: Device Implant    HPI: Ms. Rodríguez is a 71 y/o female who is known to [facility], [facility]. The patient presented to the ER on 4.3.23 with c/o Epigastric/CP. The patient reported that last week she had an ICD Placed and was doing well, however, on the morning of presentation she developed some CP that was located to her epigastric area and midsternal chest. The pain was rate 5/10 on a verbal scale that was described as a "burning" sensation. She did report that over the last few days she has been having a cough that was non-productive. Denied Fever and Chills. She was given Nitro Spray in route and she developed hypotension and was given IVF Bolus. She was evaluated in the ER and found to have: WBC 6.3, H&H 8.8/25.7 (Last H&H in Office [10.28.22]: 13.7/41.2), Glucose 397, .3, Troponin 0.032 and a CXR with Worsening Opacification of the LLL concerning for Infectious Process. A CT of the Chest was ordered and is pending. [facility] has been consulted since she recently had a ICD Placed.     4.4.23: Underwent Left VATS yesterday with removal of hemothorax, CT in place. States she feels much better today. SOB improved, +incisional CP. Paced on tele. VSS with low normal BP  4.5.23: H&H stable. Paced on tele. VSS with BP above goal. Denies SOB/Palps, +incisional CP  4.6.23: Feeling good today.  Walked some overnight and did well.  Still with CT in place.  Pain controlled.      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), Tonsillectomy, , Angiogram  Family History: Father, D, 54, Cancer; Mother, D, 71, DM II; Daughter, L, Cancer  Social History: Denies Illicit Drug, ETOH and Tobacco Use    Previous Cardiac Diagnostics:   ECHO 3.:  The study quality is average.   A limited study was performed to re-evaluate the left ventricular systolic function.   Global left ventricular systolic function is moderately decreased. The left ventricular ejection fraction is 30%.     LHC 11..22:  Normal Coronary Arteries    MPI 10.:  This is an abnormal perfusion study.   This scan is suggestive of low to moderate risk for future cardiovascular events.   Small fixed perfusion abnormality of mild intensity in the apical segment. Small fixed perfusion abnormality of moderate intensity in the basal anteroseptal segment. Small fixed perfusion abnormality of moderate intensity in the basal inferoseptal segment.   The left ventricular cavity is noted to be normal on the stress studies. The stress left ventricular ejection fraction was calculated to be 44% and left ventricular global function is mildly reduced. The rest left ventricular cavity is noted to be mildly enlarged. The rest left ventricular ejection fraction was calculated to be 37% and rest left ventricular global function is moderately reduced.   When compared to the resting ejection fraction (37%), the stress ejection fraction (44%) has increased.   The study quality is good.   There was a rise in myocardial blood flow between rest and stress.  Global myocardial blood flow reserve was 1.74.  Myocardial blood flow reserve is globally abnormal, placing the patient at a higher coronary event risk.    Review of patient's allergies indicates:  No Known Allergies    Current Facility-Administered Medications on File Prior to Encounter   Medication    ceFAZolin injection 2 g    sodium chloride  0.9% flush 10 mL     Current Outpatient Medications on File Prior to Encounter   Medication Sig    alendronate (FOSAMAX) 70 MG tablet Take 70 mg by mouth every 7 days.    ascorbic acid, vitamin C, 500 mg Chew Take 1 tablet by mouth once daily.    aspirin (ECOTRIN) 81 MG EC tablet Take 81 mg by mouth once daily.    atorvastatin (LIPITOR) 10 MG tablet Take 10 mg by mouth twice a week.    carvediloL (COREG) 25 MG tablet Take 25 mg by mouth 2 (two) times daily.    cholecalciferol, vitamin D3, (VITAMIN D3) 25 mcg (1,000 unit) capsule Take 1,000 Units by mouth once daily.    dulaglutide (TRULICITY) 0.75 mg/0.5 mL pen injector Inject 0.75 mg into the skin every 7 days.    ENTRESTO 49-51 mg per tablet Take 1 tablet by mouth 2 (two) times daily.    glipiZIDE (GLUCOTROL) 10 MG TR24 Take 10 mg by mouth once daily.    glucagon (BAQSIMI) 3 mg/actuation Spry 3 mg by Nasal route as needed. Takes PRN---never used at this time    LANTUS SOLOSTAR U-100 INSULIN glargine 100 units/mL SubQ pen Inject 40 Units into the skin once daily.    magnesium oxide (MAG-OX) 400 mg (241.3 mg magnesium) tablet Take 400 mg by mouth once daily.    metFORMIN (GLUCOPHAGE) 1000 MG tablet Take 2,000 mg by mouth daily with breakfast.    multivitamin-minerals-lutein (MULTIVITAMIN 50 PLUS) Tab Take 1 tablet by mouth once daily.    omega 3-dha-epa-fish oil 300-1,000 mg Cap Take 3,000 mg by mouth once daily.     Review of Systems   Constitutional:  Negative for fatigue and fever.   Respiratory:  Negative for chest tightness and shortness of breath.    Cardiovascular:  Positive for chest pain. Negative for palpitations and leg swelling.   Gastrointestinal:  Negative for abdominal pain and blood in stool.   All other systems reviewed and are negative.    Objective:     Vital Signs (Most Recent):  Temp: 98.5 °F (36.9 °C) (04/06/23 0715)  Pulse: 88 (04/06/23 0715)  Resp: 18 (04/06/23 0715)  BP: 115/66 (04/06/23 0715)  SpO2: 95 % (04/06/23 0715)   Vital Signs (24h  Range):  Temp:  [98.3 °F (36.8 °C)-99.1 °F (37.3 °C)] 98.5 °F (36.9 °C)  Pulse:  [69-88] 88  Resp:  [18-19] 18  SpO2:  [91 %-98 %] 95 %  BP: (115-165)/(58-79) 115/66     Weight: 58.9 kg (129 lb 12.8 oz)  Body mass index is 24.53 kg/m².    SpO2: 95 %         Intake/Output Summary (Last 24 hours) at 4/6/2023 0932  Last data filed at 4/6/2023 0601  Gross per 24 hour   Intake 1680 ml   Output 1995 ml   Net -315 ml         Lines/Drains/Airways       Drain  Duration                  Chest Tube 04/03/23 1807 Tube - 1 Left Midaxillary 28 Fr. 2 days              Peripheral Intravenous Line  Duration                  Peripheral IV - Single Lumen 04/03/23 1600 18 G Anterior;Proximal;Right Forearm 2 days         Peripheral IV - Single Lumen 04/03/23 1607 2 days                  Significant Labs:  Recent Results (from the past 72 hour(s))   Echo Saline Bubble? No    Collection Time: 04/03/23 12:52 PM   Result Value Ref Range    BSA 1.58 m2    EF 50 %    TAPSE 1.87 cm    MV mean gradient 2 mmHg    E/A ratio 0.75     E wave deceleration time 174.00 msec    MV peak gradient 4 mmHg    MV Peak E Ander 0.75 m/s    TR Max Ander 3.24 m/s    MV VTI 15.1 cm    MV Peak A Ander 1.00 m/s    Triscuspid Valve Regurgitation Peak Gradient 42 mmHg   POCT glucose    Collection Time: 04/03/23  3:32 PM   Result Value Ref Range    POCT Glucose 315 (H) 70 - 110 mg/dL   POCT glucose    Collection Time: 04/03/23  4:18 PM   Result Value Ref Range    POCT Glucose 344 (H) 70 - 110 mg/dL   POCT ARTERIAL BLOOD GAS    Collection Time: 04/03/23  5:01 PM   Result Value Ref Range    POC PH 7.41 7.35 - 7.45    POC PCO2 41 35 - 45 mmHg    POC PO2 58 (A) 80 - 100 mmHg    POC HEMOGLOBIN 9.7 (A) 12 - 16 g/dL    POC SATURATED O2 90.0 %    POC O2Hb 90.2 (A) 94.0 - 97.0 %    POC COHb 3.0 %    POC MetHb 0.60 0.40 - 1.5 %    POC Potassium 4.1 3.5 - 5.0 mmol/l    POC Sodium 133 (A) 137 - 145 mmol/l    POC Ionized Calcium 1.17 1.12 - 1.23 mmol/l    Correct Temperature (PH) 7.41  7.35 - 7.45    Corrected Temperature (pCO2) 41 35 - 45 mmHg    Corrected Temperature (pO2) 58 (A) 80 - 100 mmHg    POC HCO3 26.0 22.0 - 26.0 mmol/l    Base Deficit 1.2 -2.0 - 2.0 mmol/l    POC Temp 37.0 °C    Specimen source Arterial sample    CBC with Differential    Collection Time: 04/03/23 11:12 PM   Result Value Ref Range    WBC 9.6 4.5 - 11.5 x10(3)/mcL    RBC 2.39 (L) 4.20 - 5.40 x10(6)/mcL    Hgb 7.7 (L) 12.0 - 16.0 g/dL    Hct 21.3 (L) 37.0 - 47.0 %    MCV 89.1 80.0 - 94.0 fL    MCH 32.2 (H) 27.0 - 31.0 pg    MCHC 36.2 (H) 33.0 - 36.0 g/dL    RDW 16.1 11.5 - 17.0 %    Platelet 114 (L) 130 - 400 x10(3)/mcL    MPV 11.1 (H) 7.4 - 10.4 fL    Neut % 84.1 %    Lymph % 10.7 %    Mono % 4.6 %    Eos % 0.0 %    Basophil % 0.2 %    Lymph # 1.03 0.6 - 4.6 x10(3)/mcL    Neut # 8.09 2.1 - 9.2 x10(3)/mcL    Mono # 0.44 0.1 - 1.3 x10(3)/mcL    Eos # 0.00 0 - 0.9 x10(3)/mcL    Baso # 0.02 0 - 0.2 x10(3)/mcL    IG# 0.04 0 - 0.04 x10(3)/mcL    IG% 0.4 %    NRBC% 0.0 %   Comprehensive Metabolic Panel    Collection Time: 04/04/23  3:50 AM   Result Value Ref Range    Sodium Level 135 (L) 136 - 145 mmol/L    Potassium Level 4.8 3.5 - 5.1 mmol/L    Chloride 106 98 - 107 mmol/L    Carbon Dioxide 26 23 - 31 mmol/L    Glucose Level 236 (H) 82 - 115 mg/dL    Blood Urea Nitrogen 15.3 9.8 - 20.1 mg/dL    Creatinine 0.75 0.55 - 1.02 mg/dL    Calcium Level Total 8.8 8.4 - 10.2 mg/dL    Protein Total 4.7 (L) 5.8 - 7.6 gm/dL    Albumin Level 3.2 (L) 3.4 - 4.8 g/dL    Globulin 1.5 (L) 2.4 - 3.5 gm/dL    Albumin/Globulin Ratio 2.1 (H) 1.1 - 2.0 ratio    Bilirubin Total 0.8 <=1.5 mg/dL    Alkaline Phosphatase 22 (L) 40 - 150 unit/L    Alanine Aminotransferase 14 0 - 55 unit/L    Aspartate Aminotransferase 17 5 - 34 unit/L    eGFR >60 mls/min/1.73/m2   Magnesium    Collection Time: 04/04/23  3:50 AM   Result Value Ref Range    Magnesium Level 1.60 1.60 - 2.60 mg/dL   CBC with Differential    Collection Time: 04/04/23  3:50 AM   Result Value  Ref Range    WBC 13.2 (H) 4.5 - 11.5 x10(3)/mcL    RBC 2.80 (L) 4.20 - 5.40 x10(6)/mcL    Hgb 8.8 (L) 12.0 - 16.0 g/dL    Hct 24.9 (L) 37.0 - 47.0 %    MCV 88.9 80.0 - 94.0 fL    MCH 31.4 (H) 27.0 - 31.0 pg    MCHC 35.3 33.0 - 36.0 g/dL    RDW 16.2 11.5 - 17.0 %    Platelet 140 130 - 400 x10(3)/mcL    MPV 11.1 (H) 7.4 - 10.4 fL    Neut % 71.8 %    Lymph % 19.7 %    Mono % 8.0 %    Eos % 0.0 %    Basophil % 0.2 %    Lymph # 2.59 0.6 - 4.6 x10(3)/mcL    Neut # 9.44 (H) 2.1 - 9.2 x10(3)/mcL    Mono # 1.05 0.1 - 1.3 x10(3)/mcL    Eos # 0.00 0 - 0.9 x10(3)/mcL    Baso # 0.03 0 - 0.2 x10(3)/mcL    IG# 0.04 0 - 0.04 x10(3)/mcL    IG% 0.3 %    NRBC% 0.0 %   Hemoglobin and Hematocrit    Collection Time: 04/04/23  8:13 AM   Result Value Ref Range    Hgb 8.6 (L) 12.0 - 16.0 g/dL    Hct 25.1 (L) 37.0 - 47.0 %   Hemoglobin and Hematocrit    Collection Time: 04/04/23  5:55 PM   Result Value Ref Range    Hgb 8.7 (L) 12.0 - 16.0 g/dL    Hct 24.7 (L) 37.0 - 47.0 %   Hemoglobin and Hematocrit    Collection Time: 04/05/23 12:47 AM   Result Value Ref Range    Hgb 8.7 (L) 12.0 - 16.0 g/dL    Hct 24.7 (L) 37.0 - 47.0 %   Basic metabolic panel    Collection Time: 04/05/23  6:02 AM   Result Value Ref Range    Sodium Level 136 136 - 145 mmol/L    Potassium Level 4.3 3.5 - 5.1 mmol/L    Chloride 106 98 - 107 mmol/L    Carbon Dioxide 25 23 - 31 mmol/L    Glucose Level 246 (H) 82 - 115 mg/dL    Blood Urea Nitrogen 7.8 (L) 9.8 - 20.1 mg/dL    Creatinine 0.67 0.55 - 1.02 mg/dL    BUN/Creatinine Ratio 12     Calcium Level Total 8.1 (L) 8.4 - 10.2 mg/dL    Anion Gap 5.0 mEq/L    eGFR >60 mls/min/1.73/m2   Hemoglobin and Hematocrit    Collection Time: 04/05/23  8:54 AM   Result Value Ref Range    Hgb 8.6 (L) 12.0 - 16.0 g/dL    Hct 25.5 (L) 37.0 - 47.0 %   POCT glucose    Collection Time: 04/05/23 11:02 AM   Result Value Ref Range    POCT Glucose 352 (H) 70 - 110 mg/dL   POCT glucose    Collection Time: 04/05/23  5:03 PM   Result Value Ref Range     POCT Glucose 308 (H) 70 - 110 mg/dL   Hemoglobin and Hematocrit    Collection Time: 04/05/23  5:14 PM   Result Value Ref Range    Hgb 10.7 (L) 12.0 - 16.0 g/dL    Hct 31.4 (L) 37.0 - 47.0 %   Hemoglobin and Hematocrit    Collection Time: 04/05/23 11:02 PM   Result Value Ref Range    Hgb 9.0 (L) 12.0 - 16.0 g/dL    Hct 26.5 (L) 37.0 - 47.0 %   Comprehensive Metabolic Panel    Collection Time: 04/06/23  5:54 AM   Result Value Ref Range    Sodium Level 139 136 - 145 mmol/L    Potassium Level 4.3 3.5 - 5.1 mmol/L    Chloride 106 98 - 107 mmol/L    Carbon Dioxide 27 23 - 31 mmol/L    Glucose Level 290 (H) 82 - 115 mg/dL    Blood Urea Nitrogen 5.8 (L) 9.8 - 20.1 mg/dL    Creatinine 0.62 0.55 - 1.02 mg/dL    Calcium Level Total 8.3 (L) 8.4 - 10.2 mg/dL    Protein Total 5.1 (L) 5.8 - 7.6 gm/dL    Albumin Level 3.0 (L) 3.4 - 4.8 g/dL    Globulin 2.1 (L) 2.4 - 3.5 gm/dL    Albumin/Globulin Ratio 1.4 1.1 - 2.0 ratio    Bilirubin Total 0.8 <=1.5 mg/dL    Alkaline Phosphatase 33 (L) 40 - 150 unit/L    Alanine Aminotransferase 16 0 - 55 unit/L    Aspartate Aminotransferase 16 5 - 34 unit/L    eGFR >60 mls/min/1.73/m2   CBC with Differential    Collection Time: 04/06/23  5:54 AM   Result Value Ref Range    WBC 8.5 4.5 - 11.5 x10(3)/mcL    RBC 3.09 (L) 4.20 - 5.40 x10(6)/mcL    Hgb 9.6 (L) 12.0 - 16.0 g/dL    Hct 28.2 (L) 37.0 - 47.0 %    MCV 91.3 80.0 - 94.0 fL    MCH 31.1 (H) 27.0 - 31.0 pg    MCHC 34.0 33.0 - 36.0 g/dL    RDW 14.6 11.5 - 17.0 %    Platelet 191 130 - 400 x10(3)/mcL    MPV 10.7 (H) 7.4 - 10.4 fL    Neut % 73.7 %    Lymph % 14.4 %    Mono % 7.9 %    Eos % 3.1 %    Basophil % 0.5 %    Lymph # 1.22 0.6 - 4.6 x10(3)/mcL    Neut # 6.27 2.1 - 9.2 x10(3)/mcL    Mono # 0.67 0.1 - 1.3 x10(3)/mcL    Eos # 0.26 0 - 0.9 x10(3)/mcL    Baso # 0.04 0 - 0.2 x10(3)/mcL    IG# 0.03 0 - 0.04 x10(3)/mcL    IG% 0.4 %    NRBC% 0.0 %     Significant Imaging:  Imaging Results               CTA Chest Aorta Non Coronary (Final result)   Result time 04/03/23 15:35:12      Final result by Chris Joaquin MD (04/03/23 15:35:12)                   Narrative:    EXAMINATION  CTA CHEST AORTA NON CORONARY    CLINICAL HISTORY  possible active lung bleed, hemothorax;    TECHNIQUE  Pre- and post-contrast helical-acquisition CT images were obtained; imaging timed to coincide with arterial opacification for purpose of CT angiography.  Multiplanar reconstructions, to include MIP and volume-rendered (3D) images, were accomplished by a CT technologist at a separate workstation and pushed to PACS for physician review.    CONTRAST  IV: ISOVUE-370, 100 mL    COMPARISON  Routine post-contrast chest CT performed earlier the same day.    FINDINGS  Images were reviewed in soft tissue, lung, and bone windows.    Exam quality: adequate for evaluation    Lines/tubes: No interval change.    Previously described massive heterogeneous left pleural effusion consistent with hemothorax again noted.  Areas of lingular hyperdensity concerning for potential contrast extravasation due to active hemorrhage or less pronounced.  However, there is a faint focus of hyperdensity on the initial post-contrast acquisition that is not readily appreciated on the pre-contrast imaging and suspected to represent minimal localized bleeding arising from the anterior left 5th intercostal artery (series 32, images 252-254; series 28, images 9-12; series 51, images 44-45).  No significant enlargement or other interval changes are appreciated on the delayed acquisition.    No other sites of abnormal pleural opacities suggestive of additional active hemorrhage appreciated.  There is no focal contour irregularity of the myometrium or pericardium, and no significant worsening of pericardial fluid.  The large mediastinal vessels are unchanged in comparison.  No new or worsened short interval changes of the right hemithorax, thoracic wall soft tissues and osseous structures, or included upper abdomen  appreciated.    IMPRESSION  1. No significant short interval change of the large left hemothorax volume and adjacent lung parenchymal abnormalities.  2. Persistent, but less pronounced focus of contrast blush neck may arrive from the anterior left 5th intercostal artery but is otherwise again of indeterminate source.  3. Remaining findings unchanged.  ==========    This report was flagged in Epic as abnormal.    RADIATION DOSE  Automated tube current modulation, weight-based exposure dosing, and/or iterative reconstruction technique utilized to reach lowest reasonably achievable exposure rate.    DLP: 654 mGy*cm      Electronically signed by: Chris Joaquin  Date:    04/03/2023  Time:    15:35                                      CT Chest With Contrast (Final result)  Result time 04/03/23 11:54:54      Final result by Chris Joaquin MD (04/03/23 11:54:54)                   Narrative:    EXAMINATION  CT CHEST WITH CONTRAST    CLINICAL HISTORY  Pneumonia, unresolved;    TECHNIQUE  Post-contrast helical-acquisition CT images were obtained and multiplanar reformats accomplished by a CT technologist at a separate workstation, pushed to PACS for physician review.    CONTRAST  IV: ISOVUE-370, 100 mL    COMPARISON  *No prior cross-sectional imaging is available for direct comparison.  *Radiographs obtained earlier the same day were reviewed.    FINDINGS  Images were reviewed in lung, soft tissue, and bone windows.    Exam quality: adequate for evaluation    Lines/tubes: Left chest wall pacemaker and associated leads are again appreciated.    Heart chambers are mildly prominent volume by overall visual appearance.  No significant pericardial fluid is identified.  Scattered aortic mural calcification is present, as well as calcific densities at the arch and upper abdominal branch vessels.  No focal vessel contour irregularity or aneurysmal dilatation is identified.    Central airways are widely patent.  Irregular ground-glass  and more consolidative airspace densities are scattered through the left upper lobe.  The left lower lobe is completely compressed by large volume layering pleural fluid.  Overall fluid is heterogeneous, with irregular areas of increased attenuation, as well as several focal hyperdensities in the region of the lingula that are similar attenuation to intraluminal contrast (series 2, images 65-75).  Differentiation of potential hematoma versus compressed lung is difficult through this region, but there is expected complete loss of airspace volume due to compression involving the left lower lobe.  No clear source for potential active bleeding.  The right lung is predominantly clear, with no significant pleural fluid identified.  There is no pneumothorax.    No pathologic lymph node enlargement or necrotic adenopathy is evident.  Thyroid gland is normal for CT assessment.  The visualized upper abdominal structures are without evidence of acute or suspicious focal process.  Both kidneys enhance in temporally symmetric fashion.    There is no focal abnormality of the thoracic wall subcutaneous tissues.  Regional musculature is unremarkable.  No acute osseous displacement or destructive skeletal lesion is evident.  Degenerative alterations are present throughout the visualized spinal column and both shoulders.    IMPRESSION  1. Large volume left hemothorax, with question of possible contrast blush due to active hemorrhage at the region of the lingula.  Given overall location and injury orientation, this could represent sequela of recent procedure such is pericardiocentesis with resulting anterior intercostal artery injury.  Otherwise, definite source of suspected hemorrhage is not clearly discernible.  2. Complete compressive atelectasis of the left lower lobe, with scattered irregular ground-glass and more consolidative airspace densities through the mildly compressed left upper lobe.  3. No other evidence of acute or  suspicious focal intrathoracic abnormality.  Additional secondary details discussed above.  ==========    The above findings were discussed via telephone with Dr. Cohn (Emergency Dept) prior to completion of the final report (4/3/2023; 11:19).    This report was flagged in Epic as abnormal.    RADIATION DOSE  Automated tube current modulation, weight-based exposure dosing, and/or iterative reconstruction technique utilized to reach lowest reasonably achievable exposure rate.    DLP: 163 mGy*cm      Electronically signed by: Chris Joaquin  Date:    04/03/2023  Time:    11:54                                     X-Ray Chest 1 View (Final result)  Result time 04/03/23 07:26:47   Procedure changed from X-Ray Chest PA And Lateral     Final result by Pierce Zamarripa MD (04/03/23 07:26:47)                   Impression:      Worsening opacification of the left lower lobe concerning for infectious process.      Electronically signed by: Pierce Zamarripa  Date:    04/03/2023  Time:    07:26               Narrative:    EXAMINATION:  XR CHEST 1 VIEW    CLINICAL HISTORY:  Chest Pain;    TECHNIQUE:  Single view of the chest    COMPARISON:  03/23/2023    FINDINGS:  Interval development of left lower lobe opacification with associated left effusion.  The right hemithorax is clear.                                    EKG:       Telemetry: AV Paced    Physical Exam  Constitutional:       Appearance: Normal appearance.   HENT:      Head: Normocephalic.      Mouth/Throat:      Mouth: Mucous membranes are moist.   Eyes:      Extraocular Movements: Extraocular movements intact.   Cardiovascular:      Rate and Rhythm: Normal rate and regular rhythm.      Pulses: Normal pulses.      Heart sounds: Normal heart sounds.   Pulmonary:      Effort: Pulmonary effort is normal.      Breath sounds: Examination of the left-lower field reveals decreased breath sounds. Decreased breath sounds present.      Comments: Diminished BS bilat  Abdominal:       Palpations: Abdomen is soft.   Musculoskeletal:         General: No swelling. Normal range of motion.   Skin:     General: Skin is warm and dry.      Comments: L CW Pacer Site with Dermabond Intact, No Sign of Bleed/Infection; Mild Ecchymosis to Incision Site    L CT in Place   Neurological:      General: No focal deficit present.      Mental Status: She is alert and oriented to person, place, and time.   Psychiatric:         Mood and Affect: Mood normal.         Behavior: Behavior normal.     Home Medications:   Current Facility-Administered Medications on File Prior to Encounter   Medication Dose Route Frequency Provider Last Rate Last Admin    ceFAZolin injection 2 g  2 g Intravenous On Call Procedure Demario Childress MD   2 g at 03/23/23 1417    sodium chloride 0.9% flush 10 mL  10 mL Intravenous PRN Demario Childress MD         Current Outpatient Medications on File Prior to Encounter   Medication Sig Dispense Refill    alendronate (FOSAMAX) 70 MG tablet Take 70 mg by mouth every 7 days.      ascorbic acid, vitamin C, 500 mg Chew Take 1 tablet by mouth once daily.      aspirin (ECOTRIN) 81 MG EC tablet Take 81 mg by mouth once daily.      atorvastatin (LIPITOR) 10 MG tablet Take 10 mg by mouth twice a week.      carvediloL (COREG) 25 MG tablet Take 25 mg by mouth 2 (two) times daily.      cholecalciferol, vitamin D3, (VITAMIN D3) 25 mcg (1,000 unit) capsule Take 1,000 Units by mouth once daily.      dulaglutide (TRULICITY) 0.75 mg/0.5 mL pen injector Inject 0.75 mg into the skin every 7 days.      ENTRESTO 49-51 mg per tablet Take 1 tablet by mouth 2 (two) times daily.      glipiZIDE (GLUCOTROL) 10 MG TR24 Take 10 mg by mouth once daily.      glucagon (BAQSIMI) 3 mg/actuation Spry 3 mg by Nasal route as needed. Takes PRN---never used at this time      LANTUS SOLOSTAR U-100 INSULIN glargine 100 units/mL SubQ pen Inject 40 Units into the skin once daily.      magnesium oxide (MAG-OX) 400 mg (241.3 mg magnesium) tablet  Take 400 mg by mouth once daily.      metFORMIN (GLUCOPHAGE) 1000 MG tablet Take 2,000 mg by mouth daily with breakfast.      multivitamin-minerals-lutein (MULTIVITAMIN 50 PLUS) Tab Take 1 tablet by mouth once daily.      omega 3-dha-epa-fish oil 300-1,000 mg Cap Take 3,000 mg by mouth once daily.       Current Inpatient Medications:    Current Facility-Administered Medications:     0.9%  NaCl infusion (for blood administration), , Intravenous, Q24H PRN, Sumeet Cohn IV, MD    acetaminophen tablet 650 mg, 650 mg, Oral, Q4H PRN, Armando Dalal IV, MD, 650 mg at 04/04/23 1428    atorvastatin tablet 10 mg, 10 mg, Oral, Twice Weekly, ALEJANDRO Velasquez    carvediloL tablet 12.5 mg, 12.5 mg, Oral, BID, Ricki Colon AGACNP-BC, 12.5 mg at 04/05/23 2113    dextrose 10% bolus 125 mL 125 mL, 12.5 g, Intravenous, PRN, ALEJANDRO Velasquez    dextrose 10% bolus 250 mL 250 mL, 25 g, Intravenous, PRN, ALEJANDRO Velasquez    docusate sodium capsule 100 mg, 100 mg, Oral, BID, Armando Dalal IV, MD, 100 mg at 04/05/23 2112    enoxaparin injection 40 mg, 40 mg, Subcutaneous, Daily, Armando Dalal IV, MD, 40 mg at 04/05/23 1725    famotidine tablet 20 mg, 20 mg, Oral, BID, Armando Dalal IV, MD, 20 mg at 04/05/23 2112    glucagon (human recombinant) injection 1 mg, 1 mg, Intramuscular, PRN, ALEJANDRO Velasquez    glucose chewable tablet 16 g, 16 g, Oral, PRN, ALEJANDRO Velasquez    glucose chewable tablet 16 g, 16 g, Oral, PRN, ALEJANDRO Velasquez    glucose chewable tablet 24 g, 24 g, Oral, PRN, ALEJANDRO Velasquez    glucose chewable tablet 32 g, 32 g, Oral, PRN, ALEJANDRO Velasquez    insulin aspart U-100 injection 0-5 Units, 0-5 Units, Subcutaneous, QID (AC + HS) PRN, ALEJANDRO Velasquez, 3 Units at 04/05/23 2112    insulin regular injection 4 Units 0.04 mL, 4 Units, Intravenous, Once, Jian Avelar Jr., MD    loperamide capsule 4 mg, 4 mg, Oral, Once, Armando Dalal IV, MD    magnesium oxide tablet 400 mg, 400 mg,  Oral, Daily, Juan C Holman, ANP, 400 mg at 04/05/23 0914    melatonin tablet 6 mg, 6 mg, Oral, Nightly PRN, Armando Dalal IV, MD    metoclopramide HCl injection 5 mg, 5 mg, Intravenous, Q6H PRN, Armando Dalal IV, MD    ondansetron disintegrating tablet 8 mg, 8 mg, Oral, Q8H PRN, Armando Dalal IV, MD    ondansetron injection 4 mg, 4 mg, Intravenous, Q8H PRN, Sumeet Cohn IV, MD    oxyCODONE immediate release tablet 5 mg, 5 mg, Oral, Q4H PRN, Armando Dalal IV, MD, 5 mg at 04/06/23 0518    sacubitriL-valsartan 49-51 mg per tablet 1 tablet, 1 tablet, Oral, BID, Ricki Colon, AGACNP-BC, 1 tablet at 04/05/23 2113    sodium chloride 0.9% flush 10 mL, 10 mL, Intravenous, PRN, Sumeet Cohn IV, MD    Facility-Administered Medications Ordered in Other Encounters:     ceFAZolin injection 2 g, 2 g, Intravenous, On Call Procedure, Demario Childress MD, 2 g at 03/23/23 1417    sodium chloride 0.9% flush 10 mL, 10 mL, Intravenous, PRN, Demario Childress MD    VTE Risk Mitigation (From admission, onward)           Ordered     enoxaparin injection 40 mg  Daily         04/03/23 1816     IP VTE HIGH RISK PATIENT  Once         04/03/23 1816     Place sequential compression device  Until discontinued         04/03/23 1816     Place sequential compression device  Until discontinued         04/03/23 1451                  Assessment:     See below MDM formulated by me (Tab Turner MD):    L Sided Hemothorax/L VATS and CT placement  Anemia (Acute Blood Loss)    H/H stable  Hypotension s/p Nitro Sprays  NICMO/EF 30%    - s/p BiV CRT-D Implant (3.23.23) - [facility]  Normal Coronaries  Hx of HTN  DM II  Hx of LBBB  Bilateral DONALD/Mild  Vertigo    Plan:   Cont Coreg and Entresto  Con to hold Anticoagulants/Antiplatelets  CT Management per [facility], discharge once she has recovered from surgery  Probably does not need labs in the morning unless CTS wants H/H      Tab Turner MD   Cardiology  [facility]  04/06/2023

## 2023-04-07 LAB
ABO + RH BLD: NORMAL
ABO + RH BLD: NORMAL
BLD PROD TYP BPU: NORMAL
BLD PROD TYP BPU: NORMAL
BLOOD UNIT EXPIRATION DATE: NORMAL
BLOOD UNIT EXPIRATION DATE: NORMAL
BLOOD UNIT TYPE CODE: 600
BLOOD UNIT TYPE CODE: 600
CROSSMATCH INTERPRETATION: NORMAL
CROSSMATCH INTERPRETATION: NORMAL
DISPENSE STATUS: NORMAL
DISPENSE STATUS: NORMAL
HCT VFR BLD AUTO: 25.5 % (ref 37–47)
HCT VFR BLD AUTO: 26.9 % (ref 37–47)
HCT VFR BLD AUTO: 28.7 % (ref 37–47)
HGB BLD-MCNC: 8.7 G/DL (ref 12–16)
HGB BLD-MCNC: 9.1 G/DL (ref 12–16)
HGB BLD-MCNC: 9.6 G/DL (ref 12–16)
POCT GLUCOSE: 309 MG/DL (ref 70–110)
POCT GLUCOSE: 356 MG/DL (ref 70–110)
UNIT NUMBER: NORMAL
UNIT NUMBER: NORMAL

## 2023-04-07 PROCEDURE — 25000003 PHARM REV CODE 250: Performed by: SPECIALIST

## 2023-04-07 PROCEDURE — 21400001 HC TELEMETRY ROOM

## 2023-04-07 PROCEDURE — 63600175 PHARM REV CODE 636 W HCPCS: Performed by: NURSE PRACTITIONER

## 2023-04-07 PROCEDURE — 85014 HEMATOCRIT: CPT | Performed by: NURSE PRACTITIONER

## 2023-04-07 PROCEDURE — 63600175 PHARM REV CODE 636 W HCPCS: Performed by: SPECIALIST

## 2023-04-07 PROCEDURE — 99024 POSTOP FOLLOW-UP VISIT: CPT | Mod: ,,, | Performed by: PHYSICIAN ASSISTANT

## 2023-04-07 PROCEDURE — 99024 PR POST-OP FOLLOW-UP VISIT: ICD-10-PCS | Mod: ,,, | Performed by: PHYSICIAN ASSISTANT

## 2023-04-07 PROCEDURE — 25000003 PHARM REV CODE 250: Performed by: NURSE PRACTITIONER

## 2023-04-07 PROCEDURE — 27000221 HC OXYGEN, UP TO 24 HOURS

## 2023-04-07 RX ADMIN — DOCUSATE SODIUM 100 MG: 100 CAPSULE, LIQUID FILLED ORAL at 09:04

## 2023-04-07 RX ADMIN — ENOXAPARIN SODIUM 40 MG: 40 INJECTION SUBCUTANEOUS at 05:04

## 2023-04-07 RX ADMIN — INSULIN ASPART 5 UNITS: 100 INJECTION, SOLUTION INTRAVENOUS; SUBCUTANEOUS at 05:04

## 2023-04-07 RX ADMIN — CARVEDILOL 12.5 MG: 12.5 TABLET, FILM COATED ORAL at 09:04

## 2023-04-07 RX ADMIN — INSULIN ASPART 3 UNITS: 100 INJECTION, SOLUTION INTRAVENOUS; SUBCUTANEOUS at 08:04

## 2023-04-07 RX ADMIN — CARVEDILOL 12.5 MG: 12.5 TABLET, FILM COATED ORAL at 08:04

## 2023-04-07 RX ADMIN — DOCUSATE SODIUM 100 MG: 100 CAPSULE, LIQUID FILLED ORAL at 08:04

## 2023-04-07 RX ADMIN — INSULIN ASPART 5 UNITS: 100 INJECTION, SOLUTION INTRAVENOUS; SUBCUTANEOUS at 11:04

## 2023-04-07 RX ADMIN — SACUBITRIL AND VALSARTAN 1 TABLET: 49; 51 TABLET, FILM COATED ORAL at 09:04

## 2023-04-07 RX ADMIN — SACUBITRIL AND VALSARTAN 1 TABLET: 49; 51 TABLET, FILM COATED ORAL at 08:04

## 2023-04-07 RX ADMIN — OXYCODONE HYDROCHLORIDE 5 MG: 5 TABLET ORAL at 06:04

## 2023-04-07 RX ADMIN — Medication 400 MG: at 09:04

## 2023-04-07 RX ADMIN — FAMOTIDINE 20 MG: 20 TABLET, FILM COATED ORAL at 08:04

## 2023-04-07 RX ADMIN — INSULIN ASPART 4 UNITS: 100 INJECTION, SOLUTION INTRAVENOUS; SUBCUTANEOUS at 07:04

## 2023-04-07 RX ADMIN — FAMOTIDINE 20 MG: 20 TABLET, FILM COATED ORAL at 09:04

## 2023-04-07 NOTE — PROGRESS NOTES
CT SURGERY PROGRESS NOTE  Michelle Rordíguez  72 y.o.  1950    Patients Procedure: Procedure(s) (LRB):  VATS (VIDEO-ASSISTED THORACOSCOPIC SURGERY) (Left)    Subjective  Interval History: POD 4    ROS    Medication List  Infusions    Scheduled   atorvastatin  10 mg Oral Twice Weekly    carvediloL  12.5 mg Oral BID    docusate sodium  100 mg Oral BID    enoxaparin  40 mg Subcutaneous Daily    famotidine  20 mg Oral BID    insulin regular  4 Units Intravenous Once    loperamide  4 mg Oral Once    magnesium oxide  400 mg Oral Daily    sacubitriL-valsartan  1 tablet Oral BID       Objective:  Recent Vitals:  Temp:  [98.3 °F (36.8 °C)-99.1 °F (37.3 °C)] 98.6 °F (37 °C)  Pulse:  [71-88] 78  Resp:  [18-20] 18  SpO2:  [90 %-98 %] 94 %  BP: (104-157)/(48-69) 112/48    Physical Exam     I/O last 24 hrs:  Intake/Output - Last 3 Shifts         04/05 0700 04/06 0659 04/06 0700 04/07 0659 04/07 0700 04/08 0659    P.O. 1680 1800     Total Intake(mL/kg) 1680 (28.5) 1800 (30.6)     Urine (mL/kg/hr) 1625 (1.1) 1 (0)     Stool 0 0     Chest Tube 370 130     Total Output 1995 131     Net -315 +1669            Urine Occurrence 4 x 10 x     Stool Occurrence 0 x 0 x             Labs  BMP:   Recent Labs   Lab 04/06/23  0554      K 4.3   CO2 27   BUN 5.8*   CREATININE 0.62   CALCIUM 8.3*     CBC:   Recent Labs   Lab 04/06/23  0554 04/06/23  0845 04/07/23  0026   WBC 8.5  --   --    RBC 3.09*  --   --    HGB 9.6*   < > 8.7*   HCT 28.2*   < > 25.5*     --   --    MCV 91.3  --   --    MCH 31.1*  --   --    MCHC 34.0  --   --     < > = values in this interval not displayed.     CMP:   Recent Labs   Lab 04/06/23  0554   CALCIUM 8.3*   ALBUMIN 3.0*      K 4.3   CO2 27   BUN 5.8*   CREATININE 0.62   ALKPHOS 33*   ALT 16   AST 16   BILITOT 0.8         Imaging:   CXR: No results found in the last 24 hours.        ASSESSMENT/PLAN:    POD 4  DC drain   CXR 24- DC home?    Case and plan of care discussed with MD Jian Carvalho,  MILLY

## 2023-04-07 NOTE — PLAN OF CARE
Problem: Adult Inpatient Plan of Care  Goal: Plan of Care Review  Outcome: Ongoing, Progressing  Flowsheets (Taken 4/6/2023 1932)  Plan of Care Reviewed With:   patient   spouse   daughter  Goal: Absence of Hospital-Acquired Illness or Injury  Outcome: Ongoing, Progressing  Intervention: Identify and Manage Fall Risk  Flowsheets (Taken 4/6/2023 1932)  Safety Promotion/Fall Prevention:   assistive device/personal item within reach   Fall Risk reviewed with patient/family   Fall Risk signage in place   in recliner, wheels locked   instructed to call staff for mobility   nonskid shoes/socks when out of bed  Goal: Optimal Comfort and Wellbeing  Outcome: Ongoing, Progressing  Intervention: Monitor Pain and Promote Comfort  Flowsheets (Taken 4/6/2023 1932)  Pain Management Interventions: quiet environment facilitated  Goal: Readiness for Transition of Care  Outcome: Ongoing, Progressing     Problem: Adult Inpatient Plan of Care  Goal: Absence of Hospital-Acquired Illness or Injury  Outcome: Ongoing, Progressing  Intervention: Identify and Manage Fall Risk  Flowsheets (Taken 4/6/2023 1932)  Safety Promotion/Fall Prevention:   assistive device/personal item within reach   Fall Risk reviewed with patient/family   Fall Risk signage in place   in recliner, wheels locked   instructed to call staff for mobility   nonskid shoes/socks when out of bed     Problem: Fall Injury Risk  Goal: Absence of Fall and Fall-Related Injury  Outcome: Ongoing, Progressing     Problem: Infection  Goal: Absence of Infection Signs and Symptoms  Outcome: Ongoing, Progressing  Intervention: Prevent or Manage Infection  Flowsheets (Taken 4/6/2023 1932)  Infection Management: aseptic technique maintained

## 2023-04-07 NOTE — PROGRESS NOTES
"  [facility]  Cardiology  Progress note    Patient Name: Michelle Rodríguez  MRN: [id number]  Admission Date: 4/3/2023  Hospital Length of Stay: 4 days  Code Status: Full Code   Attending Provider: Jian Avelar Jr., MD   Consulting Provider: Tab Turner MD  Primary Care Physician: MIKE Mendoza  Principal Problem:<principal problem not specified>    Patient information was obtained from patient, past medical records, and ER records.     Subjective:     Chief Complaint: Reason for Consult: Device Implant    HPI: Ms. Rodríguez is a 71 y/o female who is known to [facility], [facility]. The patient presented to the ER on 4.3.23 with c/o Epigastric/CP. The patient reported that last week she had an ICD Placed and was doing well, however, on the morning of presentation she developed some CP that was located to her epigastric area and midsternal chest. The pain was rate 5/10 on a verbal scale that was described as a "burning" sensation. She did report that over the last few days she has been having a cough that was non-productive. Denied Fever and Chills. She was given Nitro Spray in route and she developed hypotension and was given IVF Bolus. She was evaluated in the ER and found to have: WBC 6.3, H&H 8.8/25.7 (Last H&H in Office [10.28.22]: 13.7/41.2), Glucose 397, .3, Troponin 0.032 and a CXR with Worsening Opacification of the LLL concerning for Infectious Process. A CT of the Chest was ordered and is pending. [facility] has been consulted since she recently had a ICD Placed.     4.4.23: Underwent Left VATS yesterday with removal of hemothorax, CT in place. States she feels much better today. SOB improved, +incisional CP. Paced on tele. VSS with low normal BP  4.5.23: H&H stable. Paced on tele. VSS with BP above goal. Denies SOB/Palps, +incisional CP  4.6.23: Feeling good today.  Walked some overnight and did well.  Still with CT in place.  Pain controlled.    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), Tonsillectomy, , Angiogram  Family History: Father, D, 54, Cancer; Mother, D, 71, DM II; Daughter, L, Cancer  Social History: Denies Illicit Drug, ETOH and Tobacco Use    Previous Cardiac Diagnostics:   ECHO 3:  The study quality is average.   A limited study was performed to re-evaluate the left ventricular systolic function.   Global left ventricular systolic function is moderately decreased. The left ventricular ejection fraction is 30%.     LHC 11..22:  Normal Coronary Arteries    MPI 10.6:  This is an abnormal perfusion study.   This scan is suggestive of low to moderate risk for future cardiovascular events.   Small fixed perfusion abnormality of mild intensity in the apical segment. Small fixed perfusion abnormality of moderate intensity in the basal anteroseptal segment. Small fixed perfusion abnormality of moderate intensity in the basal inferoseptal segment.   The left ventricular cavity is noted to be normal on the stress studies. The stress left ventricular ejection fraction was calculated to be 44% and left ventricular global function is mildly reduced. The rest left ventricular cavity is noted to be mildly enlarged. The rest left ventricular ejection fraction was calculated to be 37% and rest left ventricular global function is moderately reduced.   When compared to the resting ejection fraction (37%), the stress ejection fraction (44%) has increased.   The study quality is good.   There was a rise in myocardial blood flow between rest and stress.  Global myocardial blood flow reserve was 1.74.  Myocardial blood flow reserve is globally abnormal, placing the patient at a higher coronary event risk.    Review of patient's allergies indicates:  No Known Allergies    Current Facility-Administered Medications on File Prior to Encounter   Medication    ceFAZolin  injection 2 g    sodium chloride 0.9% flush 10 mL     Current Outpatient Medications on File Prior to Encounter   Medication Sig    alendronate (FOSAMAX) 70 MG tablet Take 70 mg by mouth every 7 days.    ascorbic acid, vitamin C, 500 mg Chew Take 1 tablet by mouth once daily.    aspirin (ECOTRIN) 81 MG EC tablet Take 81 mg by mouth once daily.    atorvastatin (LIPITOR) 10 MG tablet Take 10 mg by mouth twice a week.    carvediloL (COREG) 25 MG tablet Take 25 mg by mouth 2 (two) times daily.    cholecalciferol, vitamin D3, (VITAMIN D3) 25 mcg (1,000 unit) capsule Take 1,000 Units by mouth once daily.    dulaglutide (TRULICITY) 0.75 mg/0.5 mL pen injector Inject 0.75 mg into the skin every 7 days.    ENTRESTO 49-51 mg per tablet Take 1 tablet by mouth 2 (two) times daily.    glipiZIDE (GLUCOTROL) 10 MG TR24 Take 10 mg by mouth once daily.    glucagon (BAQSIMI) 3 mg/actuation Spry 3 mg by Nasal route as needed. Takes PRN---never used at this time    LANTUS SOLOSTAR U-100 INSULIN glargine 100 units/mL SubQ pen Inject 40 Units into the skin once daily.    magnesium oxide (MAG-OX) 400 mg (241.3 mg magnesium) tablet Take 400 mg by mouth once daily.    metFORMIN (GLUCOPHAGE) 1000 MG tablet Take 2,000 mg by mouth daily with breakfast.    multivitamin-minerals-lutein (MULTIVITAMIN 50 PLUS) Tab Take 1 tablet by mouth once daily.    omega 3-dha-epa-fish oil 300-1,000 mg Cap Take 3,000 mg by mouth once daily.     Review of Systems   Constitutional:  Negative for fatigue and fever.   Respiratory:  Negative for chest tightness and shortness of breath.    Cardiovascular:  Positive for chest pain. Negative for palpitations and leg swelling.   Gastrointestinal:  Negative for abdominal pain and blood in stool.   All other systems reviewed and are negative.    Objective:     Vital Signs (Most Recent):  Temp: 98.7 °F (37.1 °C) (04/07/23 1123)  Pulse: 67 (04/07/23 1123)  Resp: 18 (04/07/23 1123)  BP: 128/71 (04/07/23 1123)  SpO2: (!) 94 %  (04/07/23 1123)   Vital Signs (24h Range):  Temp:  [98.5 °F (36.9 °C)-99.1 °F (37.3 °C)] 98.7 °F (37.1 °C)  Pulse:  [67-80] 67  Resp:  [18-20] 18  SpO2:  [90 %-98 %] 94 %  BP: (112-157)/(48-71) 128/71     Weight: 58.9 kg (129 lb 12.8 oz)  Body mass index is 24.53 kg/m².    SpO2: (!) 94 %         Intake/Output Summary (Last 24 hours) at 4/7/2023 1207  Last data filed at 4/7/2023 0600  Gross per 24 hour   Intake 1800 ml   Output 131 ml   Net 1669 ml         Lines/Drains/Airways       Peripheral Intravenous Line  Duration                  Peripheral IV - Single Lumen 04/03/23 1607 3 days                  Significant Labs:  Recent Results (from the past 72 hour(s))   Hemoglobin and Hematocrit    Collection Time: 04/04/23  5:55 PM   Result Value Ref Range    Hgb 8.7 (L) 12.0 - 16.0 g/dL    Hct 24.7 (L) 37.0 - 47.0 %   Hemoglobin and Hematocrit    Collection Time: 04/05/23 12:47 AM   Result Value Ref Range    Hgb 8.7 (L) 12.0 - 16.0 g/dL    Hct 24.7 (L) 37.0 - 47.0 %   Basic metabolic panel    Collection Time: 04/05/23  6:02 AM   Result Value Ref Range    Sodium Level 136 136 - 145 mmol/L    Potassium Level 4.3 3.5 - 5.1 mmol/L    Chloride 106 98 - 107 mmol/L    Carbon Dioxide 25 23 - 31 mmol/L    Glucose Level 246 (H) 82 - 115 mg/dL    Blood Urea Nitrogen 7.8 (L) 9.8 - 20.1 mg/dL    Creatinine 0.67 0.55 - 1.02 mg/dL    BUN/Creatinine Ratio 12     Calcium Level Total 8.1 (L) 8.4 - 10.2 mg/dL    Anion Gap 5.0 mEq/L    eGFR >60 mls/min/1.73/m2   Hemoglobin and Hematocrit    Collection Time: 04/05/23  8:54 AM   Result Value Ref Range    Hgb 8.6 (L) 12.0 - 16.0 g/dL    Hct 25.5 (L) 37.0 - 47.0 %   POCT glucose    Collection Time: 04/05/23 11:02 AM   Result Value Ref Range    POCT Glucose 352 (H) 70 - 110 mg/dL   POCT glucose    Collection Time: 04/05/23  5:03 PM   Result Value Ref Range    POCT Glucose 308 (H) 70 - 110 mg/dL   Hemoglobin and Hematocrit    Collection Time: 04/05/23  5:14 PM   Result Value Ref Range    Hgb 10.7  (L) 12.0 - 16.0 g/dL    Hct 31.4 (L) 37.0 - 47.0 %   Hemoglobin and Hematocrit    Collection Time: 04/05/23 11:02 PM   Result Value Ref Range    Hgb 9.0 (L) 12.0 - 16.0 g/dL    Hct 26.5 (L) 37.0 - 47.0 %   Comprehensive Metabolic Panel    Collection Time: 04/06/23  5:54 AM   Result Value Ref Range    Sodium Level 139 136 - 145 mmol/L    Potassium Level 4.3 3.5 - 5.1 mmol/L    Chloride 106 98 - 107 mmol/L    Carbon Dioxide 27 23 - 31 mmol/L    Glucose Level 290 (H) 82 - 115 mg/dL    Blood Urea Nitrogen 5.8 (L) 9.8 - 20.1 mg/dL    Creatinine 0.62 0.55 - 1.02 mg/dL    Calcium Level Total 8.3 (L) 8.4 - 10.2 mg/dL    Protein Total 5.1 (L) 5.8 - 7.6 gm/dL    Albumin Level 3.0 (L) 3.4 - 4.8 g/dL    Globulin 2.1 (L) 2.4 - 3.5 gm/dL    Albumin/Globulin Ratio 1.4 1.1 - 2.0 ratio    Bilirubin Total 0.8 <=1.5 mg/dL    Alkaline Phosphatase 33 (L) 40 - 150 unit/L    Alanine Aminotransferase 16 0 - 55 unit/L    Aspartate Aminotransferase 16 5 - 34 unit/L    eGFR >60 mls/min/1.73/m2   CBC with Differential    Collection Time: 04/06/23  5:54 AM   Result Value Ref Range    WBC 8.5 4.5 - 11.5 x10(3)/mcL    RBC 3.09 (L) 4.20 - 5.40 x10(6)/mcL    Hgb 9.6 (L) 12.0 - 16.0 g/dL    Hct 28.2 (L) 37.0 - 47.0 %    MCV 91.3 80.0 - 94.0 fL    MCH 31.1 (H) 27.0 - 31.0 pg    MCHC 34.0 33.0 - 36.0 g/dL    RDW 14.6 11.5 - 17.0 %    Platelet 191 130 - 400 x10(3)/mcL    MPV 10.7 (H) 7.4 - 10.4 fL    Neut % 73.7 %    Lymph % 14.4 %    Mono % 7.9 %    Eos % 3.1 %    Basophil % 0.5 %    Lymph # 1.22 0.6 - 4.6 x10(3)/mcL    Neut # 6.27 2.1 - 9.2 x10(3)/mcL    Mono # 0.67 0.1 - 1.3 x10(3)/mcL    Eos # 0.26 0 - 0.9 x10(3)/mcL    Baso # 0.04 0 - 0.2 x10(3)/mcL    IG# 0.03 0 - 0.04 x10(3)/mcL    IG% 0.4 %    NRBC% 0.0 %   Hemoglobin and Hematocrit    Collection Time: 04/06/23  8:45 AM   Result Value Ref Range    Hgb 9.8 (L) 12.0 - 16.0 g/dL    Hct 29.4 (L) 37.0 - 47.0 %   Hemoglobin and Hematocrit    Collection Time: 04/06/23  3:49 PM   Result Value Ref Range     Hgb 9.3 (L) 12.0 - 16.0 g/dL    Hct 26.5 (L) 37.0 - 47.0 %   POCT glucose    Collection Time: 04/06/23  4:11 PM   Result Value Ref Range    POCT Glucose 417 (H) 70 - 110 mg/dL   POCT glucose    Collection Time: 04/07/23 12:11 AM   Result Value Ref Range    POCT Glucose 309 (H) 70 - 110 mg/dL   Hemoglobin and Hematocrit    Collection Time: 04/07/23 12:26 AM   Result Value Ref Range    Hgb 8.7 (L) 12.0 - 16.0 g/dL    Hct 25.5 (L) 37.0 - 47.0 %   Hemoglobin and Hematocrit    Collection Time: 04/07/23 10:01 AM   Result Value Ref Range    Hgb 9.6 (L) 12.0 - 16.0 g/dL    Hct 28.7 (L) 37.0 - 47.0 %     Significant Imaging:  Imaging Results               CTA Chest Aorta Non Coronary (Final result)  Result time 04/03/23 15:35:12      Final result by Chris Joaquin MD (04/03/23 15:35:12)                   Narrative:    EXAMINATION  CTA CHEST AORTA NON CORONARY    CLINICAL HISTORY  possible active lung bleed, hemothorax;    TECHNIQUE  Pre- and post-contrast helical-acquisition CT images were obtained; imaging timed to coincide with arterial opacification for purpose of CT angiography.  Multiplanar reconstructions, to include MIP and volume-rendered (3D) images, were accomplished by a CT technologist at a separate workstation and pushed to PACS for physician review.    CONTRAST  IV: ISOVUE-370, 100 mL    COMPARISON  Routine post-contrast chest CT performed earlier the same day.    FINDINGS  Images were reviewed in soft tissue, lung, and bone windows.    Exam quality: adequate for evaluation    Lines/tubes: No interval change.    Previously described massive heterogeneous left pleural effusion consistent with hemothorax again noted.  Areas of lingular hyperdensity concerning for potential contrast extravasation due to active hemorrhage or less pronounced.  However, there is a faint focus of hyperdensity on the initial post-contrast acquisition that is not readily appreciated on the pre-contrast imaging and suspected to  represent minimal localized bleeding arising from the anterior left 5th intercostal artery (series 32, images 252-254; series 28, images 9-12; series 51, images 44-45).  No significant enlargement or other interval changes are appreciated on the delayed acquisition.    No other sites of abnormal pleural opacities suggestive of additional active hemorrhage appreciated.  There is no focal contour irregularity of the myometrium or pericardium, and no significant worsening of pericardial fluid.  The large mediastinal vessels are unchanged in comparison.  No new or worsened short interval changes of the right hemithorax, thoracic wall soft tissues and osseous structures, or included upper abdomen appreciated.    IMPRESSION  1. No significant short interval change of the large left hemothorax volume and adjacent lung parenchymal abnormalities.  2. Persistent, but less pronounced focus of contrast blush neck may arrive from the anterior left 5th intercostal artery but is otherwise again of indeterminate source.  3. Remaining findings unchanged.  ==========    This report was flagged in Epic as abnormal.    RADIATION DOSE  Automated tube current modulation, weight-based exposure dosing, and/or iterative reconstruction technique utilized to reach lowest reasonably achievable exposure rate.    DLP: 654 mGy*cm      Electronically signed by: Chris Joaquin  Date:    04/03/2023  Time:    15:35                                      CT Chest With Contrast (Final result)  Result time 04/03/23 11:54:54      Final result by Chris Joaquin MD (04/03/23 11:54:54)                   Narrative:    EXAMINATION  CT CHEST WITH CONTRAST    CLINICAL HISTORY  Pneumonia, unresolved;    TECHNIQUE  Post-contrast helical-acquisition CT images were obtained and multiplanar reformats accomplished by a CT technologist at a separate workstation, pushed to PACS for physician review.    CONTRAST  IV: ISOVUE-370, 100 mL    COMPARISON  *No prior  cross-sectional imaging is available for direct comparison.  *Radiographs obtained earlier the same day were reviewed.    FINDINGS  Images were reviewed in lung, soft tissue, and bone windows.    Exam quality: adequate for evaluation    Lines/tubes: Left chest wall pacemaker and associated leads are again appreciated.    Heart chambers are mildly prominent volume by overall visual appearance.  No significant pericardial fluid is identified.  Scattered aortic mural calcification is present, as well as calcific densities at the arch and upper abdominal branch vessels.  No focal vessel contour irregularity or aneurysmal dilatation is identified.    Central airways are widely patent.  Irregular ground-glass and more consolidative airspace densities are scattered through the left upper lobe.  The left lower lobe is completely compressed by large volume layering pleural fluid.  Overall fluid is heterogeneous, with irregular areas of increased attenuation, as well as several focal hyperdensities in the region of the lingula that are similar attenuation to intraluminal contrast (series 2, images 65-75).  Differentiation of potential hematoma versus compressed lung is difficult through this region, but there is expected complete loss of airspace volume due to compression involving the left lower lobe.  No clear source for potential active bleeding.  The right lung is predominantly clear, with no significant pleural fluid identified.  There is no pneumothorax.    No pathologic lymph node enlargement or necrotic adenopathy is evident.  Thyroid gland is normal for CT assessment.  The visualized upper abdominal structures are without evidence of acute or suspicious focal process.  Both kidneys enhance in temporally symmetric fashion.    There is no focal abnormality of the thoracic wall subcutaneous tissues.  Regional musculature is unremarkable.  No acute osseous displacement or destructive skeletal lesion is evident.   Degenerative alterations are present throughout the visualized spinal column and both shoulders.    IMPRESSION  1. Large volume left hemothorax, with question of possible contrast blush due to active hemorrhage at the region of the lingula.  Given overall location and injury orientation, this could represent sequela of recent procedure such is pericardiocentesis with resulting anterior intercostal artery injury.  Otherwise, definite source of suspected hemorrhage is not clearly discernible.  2. Complete compressive atelectasis of the left lower lobe, with scattered irregular ground-glass and more consolidative airspace densities through the mildly compressed left upper lobe.  3. No other evidence of acute or suspicious focal intrathoracic abnormality.  Additional secondary details discussed above.  ==========    The above findings were discussed via telephone with Dr. Cohn (Emergency Dept) prior to completion of the final report (4/3/2023; 11:19).    This report was flagged in Epic as abnormal.    RADIATION DOSE  Automated tube current modulation, weight-based exposure dosing, and/or iterative reconstruction technique utilized to reach lowest reasonably achievable exposure rate.    DLP: 163 mGy*cm      Electronically signed by: Chris Joaquin  Date:    04/03/2023  Time:    11:54                                     X-Ray Chest 1 View (Final result)  Result time 04/03/23 07:26:47   Procedure changed from X-Ray Chest PA And Lateral     Final result by Pierce Zamarripa MD (04/03/23 07:26:47)                   Impression:      Worsening opacification of the left lower lobe concerning for infectious process.      Electronically signed by: Pierce Zamarripa  Date:    04/03/2023  Time:    07:26               Narrative:    EXAMINATION:  XR CHEST 1 VIEW    CLINICAL HISTORY:  Chest Pain;    TECHNIQUE:  Single view of the chest    COMPARISON:  03/23/2023    FINDINGS:  Interval development of left lower lobe opacification with  associated left effusion.  The right hemithorax is clear.                                    EKG:       Telemetry: AV Paced    Physical Exam  Constitutional:       Appearance: Normal appearance.   HENT:      Head: Normocephalic.      Mouth/Throat:      Mouth: Mucous membranes are moist.   Eyes:      Extraocular Movements: Extraocular movements intact.   Cardiovascular:      Rate and Rhythm: Normal rate and regular rhythm.      Pulses: Normal pulses.      Heart sounds: Normal heart sounds.   Pulmonary:      Effort: Pulmonary effort is normal.      Breath sounds: Examination of the left-lower field reveals decreased breath sounds. Decreased breath sounds present.      Comments: Diminished BS bilat  Abdominal:      Palpations: Abdomen is soft.   Musculoskeletal:         General: No swelling. Normal range of motion.   Skin:     General: Skin is warm and dry.      Comments: L CW Pacer Site with Dermabond Intact, No Sign of Bleed/Infection; Mild Ecchymosis to Incision Site     Neurological:      General: No focal deficit present.      Mental Status: She is alert and oriented to person, place, and time.   Psychiatric:         Mood and Affect: Mood normal.         Behavior: Behavior normal.     Home Medications:   Current Facility-Administered Medications on File Prior to Encounter   Medication Dose Route Frequency Provider Last Rate Last Admin    ceFAZolin injection 2 g  2 g Intravenous On Call Procedure Demario Childress MD   2 g at 03/23/23 1417    sodium chloride 0.9% flush 10 mL  10 mL Intravenous PRN Demario Childress MD         Current Outpatient Medications on File Prior to Encounter   Medication Sig Dispense Refill    alendronate (FOSAMAX) 70 MG tablet Take 70 mg by mouth every 7 days.      ascorbic acid, vitamin C, 500 mg Chew Take 1 tablet by mouth once daily.      aspirin (ECOTRIN) 81 MG EC tablet Take 81 mg by mouth once daily.      atorvastatin (LIPITOR) 10 MG tablet Take 10 mg by mouth twice a week.      carvediloL  (COREG) 25 MG tablet Take 25 mg by mouth 2 (two) times daily.      cholecalciferol, vitamin D3, (VITAMIN D3) 25 mcg (1,000 unit) capsule Take 1,000 Units by mouth once daily.      dulaglutide (TRULICITY) 0.75 mg/0.5 mL pen injector Inject 0.75 mg into the skin every 7 days.      ENTRESTO 49-51 mg per tablet Take 1 tablet by mouth 2 (two) times daily.      glipiZIDE (GLUCOTROL) 10 MG TR24 Take 10 mg by mouth once daily.      glucagon (BAQSIMI) 3 mg/actuation Spry 3 mg by Nasal route as needed. Takes PRN---never used at this time      LANTUS SOLOSTAR U-100 INSULIN glargine 100 units/mL SubQ pen Inject 40 Units into the skin once daily.      magnesium oxide (MAG-OX) 400 mg (241.3 mg magnesium) tablet Take 400 mg by mouth once daily.      metFORMIN (GLUCOPHAGE) 1000 MG tablet Take 2,000 mg by mouth daily with breakfast.      multivitamin-minerals-lutein (MULTIVITAMIN 50 PLUS) Tab Take 1 tablet by mouth once daily.      omega 3-dha-epa-fish oil 300-1,000 mg Cap Take 3,000 mg by mouth once daily.       Current Inpatient Medications:    Current Facility-Administered Medications:     0.9%  NaCl infusion (for blood administration), , Intravenous, Q24H PRN, Sumeet Cohn IV, MD    acetaminophen tablet 650 mg, 650 mg, Oral, Q4H PRN, Armando Dalal IV, MD, 650 mg at 04/04/23 1428    atorvastatin tablet 10 mg, 10 mg, Oral, Twice Weekly, ALEJANDRO Velasquez    carvediloL tablet 12.5 mg, 12.5 mg, Oral, BID, Ricki Colon AGACNP-BC, 12.5 mg at 04/07/23 0922    dextrose 10% bolus 125 mL 125 mL, 12.5 g, Intravenous, PRN, ALEJANDRO Velasquez    dextrose 10% bolus 250 mL 250 mL, 25 g, Intravenous, PRN, ALEJANDRO Velasquez    docusate sodium capsule 100 mg, 100 mg, Oral, BID, Armando Dalal IV, MD, 100 mg at 04/07/23 0922    enoxaparin injection 40 mg, 40 mg, Subcutaneous, Daily, Armando Dalal IV, MD, 40 mg at 04/06/23 1617    famotidine tablet 20 mg, 20 mg, Oral, BID, Armando Dalal IV, MD, 20 mg at 04/07/23 0922    glucagon  (human recombinant) injection 1 mg, 1 mg, Intramuscular, PRN, Juan C Holman, ANP    glucose chewable tablet 16 g, 16 g, Oral, PRN, Juan C Holman, ANP    glucose chewable tablet 16 g, 16 g, Oral, PRN, Juan C Holman, ANP    glucose chewable tablet 24 g, 24 g, Oral, PRN, Juan C Holman, ANP    glucose chewable tablet 32 g, 32 g, Oral, PRN, Juan C Holman, ANP    insulin aspart U-100 injection 0-5 Units, 0-5 Units, Subcutaneous, QID (AC + HS) PRN, Juan C Holman, ANP, 5 Units at 04/07/23 1138    insulin regular injection 4 Units 0.04 mL, 4 Units, Intravenous, Once, Jian Avelar Jr., MD    loperamide capsule 4 mg, 4 mg, Oral, Once, Armando Dalal IV, MD    magnesium oxide tablet 400 mg, 400 mg, Oral, Daily, ALEJANDRO Velasquez, 400 mg at 04/07/23 0922    melatonin tablet 6 mg, 6 mg, Oral, Nightly PRN, Armando Dalal IV, MD    metoclopramide HCl injection 5 mg, 5 mg, Intravenous, Q6H PRN, Armando Dalal IV, MD    ondansetron disintegrating tablet 8 mg, 8 mg, Oral, Q8H PRN, Armando Dalal IV, MD    ondansetron injection 4 mg, 4 mg, Intravenous, Q8H PRN, Sumeet Cohn IV, MD    oxyCODONE immediate release tablet 5 mg, 5 mg, Oral, Q4H PRN, Armando Dalal IV, MD, 5 mg at 04/07/23 0655    sacubitriL-valsartan 49-51 mg per tablet 1 tablet, 1 tablet, Oral, BID, Ricki Colon [facility], 1 tablet at 04/07/23 0922    sodium chloride 0.9% flush 10 mL, 10 mL, Intravenous, PRN, Sumeet Cohn IV, MD    Facility-Administered Medications Ordered in Other Encounters:     ceFAZolin injection 2 g, 2 g, Intravenous, On Call Procedure, Demario Childress MD, 2 g at 03/23/23 1417    sodium chloride 0.9% flush 10 mL, 10 mL, Intravenous, PRN, Demario Childress MD    VTE Risk Mitigation (From admission, onward)           Ordered     enoxaparin injection 40 mg  Daily         04/03/23 1816     IP VTE HIGH RISK PATIENT  Once         04/03/23 1816     Place sequential compression device  Until discontinued         04/03/23 1816     Place  sequential compression device  Until discontinued         04/03/23 1451                  Assessment:     See below MDM formulated by me (Tab Turner MD):    L Sided Hemothorax/L VATS and CT placement  Anemia (Acute Blood Loss)    H/H stable  Hypotension s/p Nitro Sprays  NICMO/EF 30%    - s/p BiV CRT-D Implant (3.23.23) - [facility]  Normal Coronaries  Hx of HTN  DM II  Hx of LBBB  Bilateral DONALD/Mild  Vertigo    Plan:   Cont Coreg and Entresto  Con to hold Anticoagulants/Antiplatelets  Possible discharge tomorrow AM if CTS is ok with it      Tab Turner MD   Cardiology  [facility]  04/07/2023

## 2023-04-08 VITALS
HEIGHT: 61 IN | OXYGEN SATURATION: 96 % | BODY MASS INDEX: 24.24 KG/M2 | WEIGHT: 128.38 LBS | RESPIRATION RATE: 20 BRPM | SYSTOLIC BLOOD PRESSURE: 133 MMHG | DIASTOLIC BLOOD PRESSURE: 65 MMHG | TEMPERATURE: 99 F | HEART RATE: 78 BPM

## 2023-04-08 PROBLEM — J94.2 HEMOTHORAX ON LEFT: Status: ACTIVE | Noted: 2023-04-08

## 2023-04-08 LAB
ALBUMIN SERPL-MCNC: 2.8 G/DL (ref 3.4–4.8)
ALBUMIN/GLOB SERPL: 1.4 RATIO (ref 1.1–2)
ALP SERPL-CCNC: 31 UNIT/L (ref 40–150)
ALT SERPL-CCNC: 17 UNIT/L (ref 0–55)
AST SERPL-CCNC: 22 UNIT/L (ref 5–34)
BACTERIA BLD CULT: NORMAL
BACTERIA BLD CULT: NORMAL
BASOPHILS # BLD AUTO: 0.03 X10(3)/MCL (ref 0–0.2)
BASOPHILS NFR BLD AUTO: 0.5 %
BILIRUBIN DIRECT+TOT PNL SERPL-MCNC: 0.9 MG/DL
BUN SERPL-MCNC: 9.4 MG/DL (ref 9.8–20.1)
CALCIUM SERPL-MCNC: 8.5 MG/DL (ref 8.4–10.2)
CHLORIDE SERPL-SCNC: 106 MMOL/L (ref 98–107)
CO2 SERPL-SCNC: 23 MMOL/L (ref 23–31)
CREAT SERPL-MCNC: 0.6 MG/DL (ref 0.55–1.02)
EOSINOPHIL # BLD AUTO: 0.36 X10(3)/MCL (ref 0–0.9)
EOSINOPHIL NFR BLD AUTO: 6.3 %
ERYTHROCYTE [DISTWIDTH] IN BLOOD BY AUTOMATED COUNT: 14.6 % (ref 11.5–17)
GFR SERPLBLD CREATININE-BSD FMLA CKD-EPI: >60 MLS/MIN/1.73/M2
GLOBULIN SER-MCNC: 2 GM/DL (ref 2.4–3.5)
GLUCOSE SERPL-MCNC: 215 MG/DL (ref 82–115)
HCT VFR BLD AUTO: 25.8 % (ref 37–47)
HCT VFR BLD AUTO: 26 % (ref 37–47)
HCT VFR BLD AUTO: 29.3 % (ref 37–47)
HGB BLD-MCNC: 10 G/DL (ref 12–16)
HGB BLD-MCNC: 8.7 G/DL (ref 12–16)
HGB BLD-MCNC: 8.8 G/DL (ref 12–16)
IMM GRANULOCYTES # BLD AUTO: 0.02 X10(3)/MCL (ref 0–0.04)
IMM GRANULOCYTES NFR BLD AUTO: 0.3 %
LYMPHOCYTES # BLD AUTO: 1.9 X10(3)/MCL (ref 0.6–4.6)
LYMPHOCYTES NFR BLD AUTO: 33.2 %
MCH RBC QN AUTO: 30.8 PG (ref 27–31)
MCHC RBC AUTO-ENTMCNC: 33.8 G/DL (ref 33–36)
MCV RBC AUTO: 90.9 FL (ref 80–94)
MONOCYTES # BLD AUTO: 0.72 X10(3)/MCL (ref 0.1–1.3)
MONOCYTES NFR BLD AUTO: 12.6 %
NEUTROPHILS # BLD AUTO: 2.69 X10(3)/MCL (ref 2.1–9.2)
NEUTROPHILS NFR BLD AUTO: 47.1 %
NRBC BLD AUTO-RTO: 0 %
PLATELET # BLD AUTO: 234 X10(3)/MCL (ref 130–400)
PMV BLD AUTO: 11 FL (ref 7.4–10.4)
POCT GLUCOSE: 307 MG/DL (ref 70–110)
POTASSIUM SERPL-SCNC: 4.4 MMOL/L (ref 3.5–5.1)
PROT SERPL-MCNC: 4.8 GM/DL (ref 5.8–7.6)
RBC # BLD AUTO: 2.86 X10(6)/MCL (ref 4.2–5.4)
SODIUM SERPL-SCNC: 138 MMOL/L (ref 136–145)
WBC # SPEC AUTO: 5.7 X10(3)/MCL (ref 4.5–11.5)

## 2023-04-08 PROCEDURE — 80053 COMPREHEN METABOLIC PANEL: CPT | Performed by: NURSE PRACTITIONER

## 2023-04-08 PROCEDURE — 85025 COMPLETE CBC W/AUTO DIFF WBC: CPT | Performed by: NURSE PRACTITIONER

## 2023-04-08 PROCEDURE — 25000003 PHARM REV CODE 250: Performed by: SPECIALIST

## 2023-04-08 PROCEDURE — 85014 HEMATOCRIT: CPT | Performed by: NURSE PRACTITIONER

## 2023-04-08 PROCEDURE — 25000003 PHARM REV CODE 250: Performed by: NURSE PRACTITIONER

## 2023-04-08 PROCEDURE — 99024 PR POST-OP FOLLOW-UP VISIT: ICD-10-PCS | Mod: ,,, | Performed by: PHYSICIAN ASSISTANT

## 2023-04-08 PROCEDURE — 99024 POSTOP FOLLOW-UP VISIT: CPT | Mod: ,,, | Performed by: PHYSICIAN ASSISTANT

## 2023-04-08 RX ADMIN — DOCUSATE SODIUM 100 MG: 100 CAPSULE, LIQUID FILLED ORAL at 08:04

## 2023-04-08 RX ADMIN — SACUBITRIL AND VALSARTAN 1 TABLET: 49; 51 TABLET, FILM COATED ORAL at 08:04

## 2023-04-08 RX ADMIN — FAMOTIDINE 20 MG: 20 TABLET, FILM COATED ORAL at 08:04

## 2023-04-08 RX ADMIN — CARVEDILOL 12.5 MG: 12.5 TABLET, FILM COATED ORAL at 08:04

## 2023-04-08 RX ADMIN — Medication 400 MG: at 08:04

## 2023-04-08 NOTE — PROGRESS NOTES
CT SURGERY PROGRESS NOTE  Michelle Rodríguez  72 y.o.  1950    Patients Procedure: Procedure(s) (LRB):  VATS (VIDEO-ASSISTED THORACOSCOPIC SURGERY) (Left)    Subjective  Interval History: NAD    ROS    Medication List  Infusions    Scheduled   atorvastatin  10 mg Oral Twice Weekly    carvediloL  12.5 mg Oral BID    docusate sodium  100 mg Oral BID    enoxaparin  40 mg Subcutaneous Daily    famotidine  20 mg Oral BID    insulin regular  4 Units Intravenous Once    loperamide  4 mg Oral Once    magnesium oxide  400 mg Oral Daily    sacubitriL-valsartan  1 tablet Oral BID       Objective:  Recent Vitals:  Temp:  [97.5 °F (36.4 °C)-98.7 °F (37.1 °C)] 98.5 °F (36.9 °C)  Pulse:  [67-78] 78  Resp:  [17-20] 20  SpO2:  [93 %-96 %] 96 %  BP: (122-157)/(65-75) 133/65    Physical Exam     I/O last 24 hrs:  Intake/Output - Last 3 Shifts         04/06 0700 04/07 0659 04/07 0700 04/08 0659 04/08 0700  04/09 0659    P.O. 1800      Total Intake(mL/kg) 1800 (30.6)      Urine (mL/kg/hr) 1 (0)      Stool 0      Chest Tube 130      Total Output 131      Net +1669             Urine Occurrence 10 x      Stool Occurrence 0 x              Labs  BMP:   Recent Labs   Lab 04/08/23  0343      K 4.4   CO2 23   BUN 9.4*   CREATININE 0.60   CALCIUM 8.5     CBC:   Recent Labs   Lab 04/08/23 0343   WBC 5.7   RBC 2.86*   HGB 8.8*   HCT 26.0*      MCV 90.9   MCH 30.8   MCHC 33.8     CMP:   Recent Labs   Lab 04/08/23  0343   CALCIUM 8.5   ALBUMIN 2.8*      K 4.4   CO2 23   BUN 9.4*   CREATININE 0.60   ALKPHOS 31*   ALT 17   AST 22   BILITOT 0.9         Imaging:   CXR: X-Ray Chest 1 View    Result Date: 4/7/2023  Left-sided chest tube removal with no pneumothorax and improving bibasilar pleuroparenchymal disease. Electronically signed by: Jian Kenny MD Date:    04/07/2023 Time:    09:25         ASSESSMENT/PLAN:    Doing well   OK for DC after CXR today       Case and plan of care discussed with MD Jian Carvalho PA-C

## 2023-04-08 NOTE — PROGRESS NOTES
Discharge instructions given to patient and . Discharge packet faxed to pt's home health. Patient's IV and heart monitor removed. Patient discharged home with home health.

## 2023-04-08 NOTE — DISCHARGE SUMMARY
"  [facility]  Cardiology  Discharge Summary    Patient Name: Michelle Rodríguez  MRN: [id number]  Admission Date: 4/3/2023  Hospital Length of Stay: 5 days  Code Status: Full Code   Attending Provider: Jian Avelar Jr., MD   Consulting Provider: Ricki Colon, [facility]  Primary Care Physician: Rachid Brito, FNP  Principal Problem:<principal problem not specified>    Patient information was obtained from patient, past medical records, and ER records.     Subjective:     Hospital Course: Ms. Rodríguez is a 73 y/o female who is known to [facility], [facility]. The patient presented to the ER on 4.3.23 with c/o Epigastric/CP. The patient reported that last week she had an ICD Placed and was doing well, however, on the morning of presentation she developed some CP that was located to her epigastric area and midsternal chest. The pain was rate 5/10 on a verbal scale that was described as a "burning" sensation. She did report that over the last few days she has been having a cough that was non-productive. Denied Fever and Chills. She was given Nitro Spray in route and she developed hypotension and was given IVF Bolus. She was evaluated in the ER and found to have: WBC 6.3, H&H 8.8/25.7 (Last H&H in Office [10.28.22]: 13.7/41.2), Glucose 397, .3, Troponin 0.032 and a CXR with Worsening Opacification of the LLL concerning for Infectious Process. A CT of the Chest was ordered and is pending.   Patient underwent left VATS with removal of hemothorax.  Chest tube was removed on 04/07/2023.   Repeat chest x-ray showed trace pneumothorax.  Case discussed Jian Carvalho from CT surgery and has been cleared for discharge.   Follow-up with Dr. Snyder in 7-10 days.  Follow-up with Dr. Childress in 1-2 weeks.  Patient is stable for discharge.    4.4.23: Underwent Left VATS yesterday with removal of hemothorax, CT in place. States she feels much better today. SOB improved, +incisional CP. Paced on tele. VSS with " "low normal BP  23: H&H stable. Paced on tele. VSS with BP above goal. Denies SOB/Palps, +incisional CP  23: Feeling good today.  Walked some overnight and did well.  Still with CT in place.  Pain controlled.    23: CT removed today  23: CXR results reviewed. Patient doing well.  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), Tonsillectomy, , Angiogram  Family History: Father, D, 54, Cancer; Mother, D, 71, DM II; Daughter, L, Cancer  Social History: Denies Illicit Drug, ETOH and Tobacco Use    Previous Cardiac Diagnostics:   ECHO 3.2.23:  The study quality is average.   A limited study was performed to re-evaluate the left ventricular systolic function.   Global left ventricular systolic function is moderately decreased. The left ventricular ejection fraction is 30%.     [facility] 11..22:  Normal Coronary Arteries    MPI 10.22:  This is an abnormal perfusion study.   This scan is suggestive of low to moderate risk for future cardiovascular events.   Small fixed perfusion abnormality of mild intensity in the apical segment. Small fixed perfusion abnormality of moderate intensity in the basal anteroseptal segment. Small fixed perfusion abnormality of moderate intensity in the basal inferoseptal segment.   The left ventricular cavity is noted to be normal on the stress studies. The stress left ventricular ejection fraction was calculated to be 44% and left ventricular global function is mildly reduced. The rest left ventricular cavity is noted to be mildly enlarged. The rest left ventricular ejection fraction was calculated to be 37% and rest left ventricular global function is moderately reduced.   When compared to the resting ejection fraction (37%), the stress ejection fraction (44%) has increased.   The study quality is good.   There was a rise in myocardial blood flow between rest and " stress.  Global myocardial blood flow reserve was 1.74.  Myocardial blood flow reserve is globally abnormal, placing the patient at a higher coronary event risk.    Review of patient's allergies indicates:  No Known Allergies    Current Facility-Administered Medications on File Prior to Encounter   Medication    ceFAZolin injection 2 g    sodium chloride 0.9% flush 10 mL     Current Outpatient Medications on File Prior to Encounter   Medication Sig    alendronate (FOSAMAX) 70 MG tablet Take 70 mg by mouth every 7 days.    ascorbic acid, vitamin C, 500 mg Chew Take 1 tablet by mouth once daily.    aspirin (ECOTRIN) 81 MG EC tablet Take 81 mg by mouth once daily.    atorvastatin (LIPITOR) 10 MG tablet Take 10 mg by mouth twice a week.    carvediloL (COREG) 25 MG tablet Take 25 mg by mouth 2 (two) times daily.    cholecalciferol, vitamin D3, (VITAMIN D3) 25 mcg (1,000 unit) capsule Take 1,000 Units by mouth once daily.    dulaglutide (TRULICITY) 0.75 mg/0.5 mL pen injector Inject 0.75 mg into the skin every 7 days.    ENTRESTO 49-51 mg per tablet Take 1 tablet by mouth 2 (two) times daily.    glipiZIDE (GLUCOTROL) 10 MG TR24 Take 10 mg by mouth once daily.    glucagon (BAQSIMI) 3 mg/actuation Spry 3 mg by Nasal route as needed. Takes PRN---never used at this time    LANTUS SOLOSTAR U-100 INSULIN glargine 100 units/mL SubQ pen Inject 40 Units into the skin once daily.    magnesium oxide (MAG-OX) 400 mg (241.3 mg magnesium) tablet Take 400 mg by mouth once daily.    metFORMIN (GLUCOPHAGE) 1000 MG tablet Take 2,000 mg by mouth daily with breakfast.    multivitamin-minerals-lutein (MULTIVITAMIN 50 PLUS) Tab Take 1 tablet by mouth once daily.    omega 3-dha-epa-fish oil 300-1,000 mg Cap Take 3,000 mg by mouth once daily.     Review of Systems   Constitutional:  Negative for fatigue and fever.   Respiratory:  Negative for chest tightness and shortness of breath.    Cardiovascular:  Positive for chest pain. Negative for  palpitations and leg swelling.   Gastrointestinal:  Negative for abdominal pain and blood in stool.   All other systems reviewed and are negative.    Objective:     Vital Signs (Most Recent):  Temp: 98.5 °F (36.9 °C) (04/08/23 0731)  Pulse: 78 (04/08/23 0731)  Resp: 20 (04/08/23 0321)  BP: 133/65 (04/08/23 0731)  SpO2: 96 % (04/08/23 0731)   Vital Signs (24h Range):  Temp:  [97.5 °F (36.4 °C)-98.7 °F (37.1 °C)] 98.5 °F (36.9 °C)  Pulse:  [67-78] 78  Resp:  [17-20] 20  SpO2:  [93 %-96 %] 96 %  BP: (122-157)/(65-75) 133/65     Weight: 58.2 kg (128 lb 6.4 oz)  Body mass index is 24.26 kg/m².    SpO2: 96 %       No intake or output data in the 24 hours ending 04/08/23 0830      Lines/Drains/Airways       Peripheral Intravenous Line  Duration                  Peripheral IV - Single Lumen 04/03/23 1607 4 days                  Significant Labs:  Recent Results (from the past 72 hour(s))   Hemoglobin and Hematocrit    Collection Time: 04/05/23  8:54 AM   Result Value Ref Range    Hgb 8.6 (L) 12.0 - 16.0 g/dL    Hct 25.5 (L) 37.0 - 47.0 %   POCT glucose    Collection Time: 04/05/23 11:02 AM   Result Value Ref Range    POCT Glucose 352 (H) 70 - 110 mg/dL   POCT glucose    Collection Time: 04/05/23  5:03 PM   Result Value Ref Range    POCT Glucose 308 (H) 70 - 110 mg/dL   Hemoglobin and Hematocrit    Collection Time: 04/05/23  5:14 PM   Result Value Ref Range    Hgb 10.7 (L) 12.0 - 16.0 g/dL    Hct 31.4 (L) 37.0 - 47.0 %   Hemoglobin and Hematocrit    Collection Time: 04/05/23 11:02 PM   Result Value Ref Range    Hgb 9.0 (L) 12.0 - 16.0 g/dL    Hct 26.5 (L) 37.0 - 47.0 %   Comprehensive Metabolic Panel    Collection Time: 04/06/23  5:54 AM   Result Value Ref Range    Sodium Level 139 136 - 145 mmol/L    Potassium Level 4.3 3.5 - 5.1 mmol/L    Chloride 106 98 - 107 mmol/L    Carbon Dioxide 27 23 - 31 mmol/L    Glucose Level 290 (H) 82 - 115 mg/dL    Blood Urea Nitrogen 5.8 (L) 9.8 - 20.1 mg/dL    Creatinine 0.62 0.55 - 1.02  mg/dL    Calcium Level Total 8.3 (L) 8.4 - 10.2 mg/dL    Protein Total 5.1 (L) 5.8 - 7.6 gm/dL    Albumin Level 3.0 (L) 3.4 - 4.8 g/dL    Globulin 2.1 (L) 2.4 - 3.5 gm/dL    Albumin/Globulin Ratio 1.4 1.1 - 2.0 ratio    Bilirubin Total 0.8 <=1.5 mg/dL    Alkaline Phosphatase 33 (L) 40 - 150 unit/L    Alanine Aminotransferase 16 0 - 55 unit/L    Aspartate Aminotransferase 16 5 - 34 unit/L    eGFR >60 mls/min/1.73/m2   CBC with Differential    Collection Time: 04/06/23  5:54 AM   Result Value Ref Range    WBC 8.5 4.5 - 11.5 x10(3)/mcL    RBC 3.09 (L) 4.20 - 5.40 x10(6)/mcL    Hgb 9.6 (L) 12.0 - 16.0 g/dL    Hct 28.2 (L) 37.0 - 47.0 %    MCV 91.3 80.0 - 94.0 fL    MCH 31.1 (H) 27.0 - 31.0 pg    MCHC 34.0 33.0 - 36.0 g/dL    RDW 14.6 11.5 - 17.0 %    Platelet 191 130 - 400 x10(3)/mcL    MPV 10.7 (H) 7.4 - 10.4 fL    Neut % 73.7 %    Lymph % 14.4 %    Mono % 7.9 %    Eos % 3.1 %    Basophil % 0.5 %    Lymph # 1.22 0.6 - 4.6 x10(3)/mcL    Neut # 6.27 2.1 - 9.2 x10(3)/mcL    Mono # 0.67 0.1 - 1.3 x10(3)/mcL    Eos # 0.26 0 - 0.9 x10(3)/mcL    Baso # 0.04 0 - 0.2 x10(3)/mcL    IG# 0.03 0 - 0.04 x10(3)/mcL    IG% 0.4 %    NRBC% 0.0 %   Hemoglobin and Hematocrit    Collection Time: 04/06/23  8:45 AM   Result Value Ref Range    Hgb 9.8 (L) 12.0 - 16.0 g/dL    Hct 29.4 (L) 37.0 - 47.0 %   Hemoglobin and Hematocrit    Collection Time: 04/06/23  3:49 PM   Result Value Ref Range    Hgb 9.3 (L) 12.0 - 16.0 g/dL    Hct 26.5 (L) 37.0 - 47.0 %   POCT glucose    Collection Time: 04/06/23  4:11 PM   Result Value Ref Range    POCT Glucose 417 (H) 70 - 110 mg/dL   POCT glucose    Collection Time: 04/07/23 12:11 AM   Result Value Ref Range    POCT Glucose 309 (H) 70 - 110 mg/dL   Hemoglobin and Hematocrit    Collection Time: 04/07/23 12:26 AM   Result Value Ref Range    Hgb 8.7 (L) 12.0 - 16.0 g/dL    Hct 25.5 (L) 37.0 - 47.0 %   Hemoglobin and Hematocrit    Collection Time: 04/07/23 10:01 AM   Result Value Ref Range    Hgb 9.6 (L) 12.0 -  16.0 g/dL    Hct 28.7 (L) 37.0 - 47.0 %   Hemoglobin and Hematocrit    Collection Time: 04/07/23  2:37 PM   Result Value Ref Range    Hgb 9.1 (L) 12.0 - 16.0 g/dL    Hct 26.9 (L) 37.0 - 47.0 %   POCT glucose    Collection Time: 04/07/23  4:37 PM   Result Value Ref Range    POCT Glucose 356 (H) 70 - 110 mg/dL   Hemoglobin and Hematocrit    Collection Time: 04/08/23 12:31 AM   Result Value Ref Range    Hgb 8.7 (L) 12.0 - 16.0 g/dL    Hct 25.8 (L) 37.0 - 47.0 %   Comprehensive Metabolic Panel    Collection Time: 04/08/23  3:43 AM   Result Value Ref Range    Sodium Level 138 136 - 145 mmol/L    Potassium Level 4.4 3.5 - 5.1 mmol/L    Chloride 106 98 - 107 mmol/L    Carbon Dioxide 23 23 - 31 mmol/L    Glucose Level 215 (H) 82 - 115 mg/dL    Blood Urea Nitrogen 9.4 (L) 9.8 - 20.1 mg/dL    Creatinine 0.60 0.55 - 1.02 mg/dL    Calcium Level Total 8.5 8.4 - 10.2 mg/dL    Protein Total 4.8 (L) 5.8 - 7.6 gm/dL    Albumin Level 2.8 (L) 3.4 - 4.8 g/dL    Globulin 2.0 (L) 2.4 - 3.5 gm/dL    Albumin/Globulin Ratio 1.4 1.1 - 2.0 ratio    Bilirubin Total 0.9 <=1.5 mg/dL    Alkaline Phosphatase 31 (L) 40 - 150 unit/L    Alanine Aminotransferase 17 0 - 55 unit/L    Aspartate Aminotransferase 22 5 - 34 unit/L    eGFR >60 mls/min/1.73/m2   CBC with Differential    Collection Time: 04/08/23  3:43 AM   Result Value Ref Range    WBC 5.7 4.5 - 11.5 x10(3)/mcL    RBC 2.86 (L) 4.20 - 5.40 x10(6)/mcL    Hgb 8.8 (L) 12.0 - 16.0 g/dL    Hct 26.0 (L) 37.0 - 47.0 %    MCV 90.9 80.0 - 94.0 fL    MCH 30.8 27.0 - 31.0 pg    MCHC 33.8 33.0 - 36.0 g/dL    RDW 14.6 11.5 - 17.0 %    Platelet 234 130 - 400 x10(3)/mcL    MPV 11.0 (H) 7.4 - 10.4 fL    Neut % 47.1 %    Lymph % 33.2 %    Mono % 12.6 %    Eos % 6.3 %    Basophil % 0.5 %    Lymph # 1.90 0.6 - 4.6 x10(3)/mcL    Neut # 2.69 2.1 - 9.2 x10(3)/mcL    Mono # 0.72 0.1 - 1.3 x10(3)/mcL    Eos # 0.36 0 - 0.9 x10(3)/mcL    Baso # 0.03 0 - 0.2 x10(3)/mcL    IG# 0.02 0 - 0.04 x10(3)/mcL    IG% 0.3 %     NRBC% 0.0 %     Significant Imaging:  Imaging Results               CTA Chest Aorta Non Coronary (Final result)  Result time 04/03/23 15:35:12      Final result by Chris Joaquin MD (04/03/23 15:35:12)                   Narrative:    EXAMINATION  CTA CHEST AORTA NON CORONARY    CLINICAL HISTORY  possible active lung bleed, hemothorax;    TECHNIQUE  Pre- and post-contrast helical-acquisition CT images were obtained; imaging timed to coincide with arterial opacification for purpose of CT angiography.  Multiplanar reconstructions, to include MIP and volume-rendered (3D) images, were accomplished by a CT technologist at a separate workstation and pushed to PACS for physician review.    CONTRAST  IV: ISOVUE-370, 100 mL    COMPARISON  Routine post-contrast chest CT performed earlier the same day.    FINDINGS  Images were reviewed in soft tissue, lung, and bone windows.    Exam quality: adequate for evaluation    Lines/tubes: No interval change.    Previously described massive heterogeneous left pleural effusion consistent with hemothorax again noted.  Areas of lingular hyperdensity concerning for potential contrast extravasation due to active hemorrhage or less pronounced.  However, there is a faint focus of hyperdensity on the initial post-contrast acquisition that is not readily appreciated on the pre-contrast imaging and suspected to represent minimal localized bleeding arising from the anterior left 5th intercostal artery (series 32, images 252-254; series 28, images 9-12; series 51, images 44-45).  No significant enlargement or other interval changes are appreciated on the delayed acquisition.    No other sites of abnormal pleural opacities suggestive of additional active hemorrhage appreciated.  There is no focal contour irregularity of the myometrium or pericardium, and no significant worsening of pericardial fluid.  The large mediastinal vessels are unchanged in comparison.  No new or worsened short interval  changes of the right hemithorax, thoracic wall soft tissues and osseous structures, or included upper abdomen appreciated.    IMPRESSION  1. No significant short interval change of the large left hemothorax volume and adjacent lung parenchymal abnormalities.  2. Persistent, but less pronounced focus of contrast blush neck may arrive from the anterior left 5th intercostal artery but is otherwise again of indeterminate source.  3. Remaining findings unchanged.  ==========    This report was flagged in Epic as abnormal.    RADIATION DOSE  Automated tube current modulation, weight-based exposure dosing, and/or iterative reconstruction technique utilized to reach lowest reasonably achievable exposure rate.    DLP: 654 mGy*cm      Electronically signed by: Chris Joaquin  Date:    04/03/2023  Time:    15:35                                      CT Chest With Contrast (Final result)  Result time 04/03/23 11:54:54      Final result by Chris Joaquin MD (04/03/23 11:54:54)                   Narrative:    EXAMINATION  CT CHEST WITH CONTRAST    CLINICAL HISTORY  Pneumonia, unresolved;    TECHNIQUE  Post-contrast helical-acquisition CT images were obtained and multiplanar reformats accomplished by a CT technologist at a separate workstation, pushed to PACS for physician review.    CONTRAST  IV: ISOVUE-370, 100 mL    COMPARISON  *No prior cross-sectional imaging is available for direct comparison.  *Radiographs obtained earlier the same day were reviewed.    FINDINGS  Images were reviewed in lung, soft tissue, and bone windows.    Exam quality: adequate for evaluation    Lines/tubes: Left chest wall pacemaker and associated leads are again appreciated.    Heart chambers are mildly prominent volume by overall visual appearance.  No significant pericardial fluid is identified.  Scattered aortic mural calcification is present, as well as calcific densities at the arch and upper abdominal branch vessels.  No focal vessel contour  irregularity or aneurysmal dilatation is identified.    Central airways are widely patent.  Irregular ground-glass and more consolidative airspace densities are scattered through the left upper lobe.  The left lower lobe is completely compressed by large volume layering pleural fluid.  Overall fluid is heterogeneous, with irregular areas of increased attenuation, as well as several focal hyperdensities in the region of the lingula that are similar attenuation to intraluminal contrast (series 2, images 65-75).  Differentiation of potential hematoma versus compressed lung is difficult through this region, but there is expected complete loss of airspace volume due to compression involving the left lower lobe.  No clear source for potential active bleeding.  The right lung is predominantly clear, with no significant pleural fluid identified.  There is no pneumothorax.    No pathologic lymph node enlargement or necrotic adenopathy is evident.  Thyroid gland is normal for CT assessment.  The visualized upper abdominal structures are without evidence of acute or suspicious focal process.  Both kidneys enhance in temporally symmetric fashion.    There is no focal abnormality of the thoracic wall subcutaneous tissues.  Regional musculature is unremarkable.  No acute osseous displacement or destructive skeletal lesion is evident.  Degenerative alterations are present throughout the visualized spinal column and both shoulders.    IMPRESSION  1. Large volume left hemothorax, with question of possible contrast blush due to active hemorrhage at the region of the lingula.  Given overall location and injury orientation, this could represent sequela of recent procedure such is pericardiocentesis with resulting anterior intercostal artery injury.  Otherwise, definite source of suspected hemorrhage is not clearly discernible.  2. Complete compressive atelectasis of the left lower lobe, with scattered irregular ground-glass and more  consolidative airspace densities through the mildly compressed left upper lobe.  3. No other evidence of acute or suspicious focal intrathoracic abnormality.  Additional secondary details discussed above.  ==========    The above findings were discussed via telephone with Dr. Cohn (Emergency Dept) prior to completion of the final report (4/3/2023; 11:19).    This report was flagged in Epic as abnormal.    RADIATION DOSE  Automated tube current modulation, weight-based exposure dosing, and/or iterative reconstruction technique utilized to reach lowest reasonably achievable exposure rate.    DLP: 163 mGy*cm      Electronically signed by: Chrsi Joaquin  Date:    04/03/2023  Time:    11:54                                     X-Ray Chest 1 View (Final result)  Result time 04/03/23 07:26:47   Procedure changed from X-Ray Chest PA And Lateral     Final result by Pierce Zamarripa MD (04/03/23 07:26:47)                   Impression:      Worsening opacification of the left lower lobe concerning for infectious process.      Electronically signed by: Pierce Zamarripa  Date:    04/03/2023  Time:    07:26               Narrative:    EXAMINATION:  XR CHEST 1 VIEW    CLINICAL HISTORY:  Chest Pain;    TECHNIQUE:  Single view of the chest    COMPARISON:  03/23/2023    FINDINGS:  Interval development of left lower lobe opacification with associated left effusion.  The right hemithorax is clear.                                    EKG:       Telemetry: AV Paced    Physical Exam  Constitutional:       Appearance: Normal appearance.   HENT:      Head: Normocephalic.      Mouth/Throat:      Mouth: Mucous membranes are moist.   Eyes:      Extraocular Movements: Extraocular movements intact.   Cardiovascular:      Rate and Rhythm: Normal rate and regular rhythm.      Pulses: Normal pulses.      Heart sounds: Normal heart sounds.   Pulmonary:      Effort: Pulmonary effort is normal.      Breath sounds: Examination of the left-lower field  reveals decreased breath sounds. Decreased breath sounds present.      Comments: Diminished BS bilat  Abdominal:      Palpations: Abdomen is soft.   Musculoskeletal:         General: No swelling. Normal range of motion.   Skin:     General: Skin is warm and dry.      Comments: L CW Pacer Site with Dermabond Intact, No Sign of Bleed/Infection; Mild Ecchymosis to Incision Site     Neurological:      General: No focal deficit present.      Mental Status: She is alert and oriented to person, place, and time.   Psychiatric:         Mood and Affect: Mood normal.         Behavior: Behavior normal.     Home Medications:   Current Facility-Administered Medications on File Prior to Encounter   Medication Dose Route Frequency Provider Last Rate Last Admin    ceFAZolin injection 2 g  2 g Intravenous On Call Procedure Demario Childress MD   2 g at 03/23/23 1417    sodium chloride 0.9% flush 10 mL  10 mL Intravenous PRN Demario Childress MD         Current Outpatient Medications on File Prior to Encounter   Medication Sig Dispense Refill    alendronate (FOSAMAX) 70 MG tablet Take 70 mg by mouth every 7 days.      ascorbic acid, vitamin C, 500 mg Chew Take 1 tablet by mouth once daily.      aspirin (ECOTRIN) 81 MG EC tablet Take 81 mg by mouth once daily.      atorvastatin (LIPITOR) 10 MG tablet Take 10 mg by mouth twice a week.      carvediloL (COREG) 25 MG tablet Take 25 mg by mouth 2 (two) times daily.      cholecalciferol, vitamin D3, (VITAMIN D3) 25 mcg (1,000 unit) capsule Take 1,000 Units by mouth once daily.      dulaglutide (TRULICITY) 0.75 mg/0.5 mL pen injector Inject 0.75 mg into the skin every 7 days.      ENTRESTO 49-51 mg per tablet Take 1 tablet by mouth 2 (two) times daily.      glipiZIDE (GLUCOTROL) 10 MG TR24 Take 10 mg by mouth once daily.      glucagon (BAQSIMI) 3 mg/actuation Spry 3 mg by Nasal route as needed. Takes PRN---never used at this time      LANTUS SOLOSTAR U-100 INSULIN glargine 100 units/mL SubQ pen Inject  40 Units into the skin once daily.      magnesium oxide (MAG-OX) 400 mg (241.3 mg magnesium) tablet Take 400 mg by mouth once daily.      metFORMIN (GLUCOPHAGE) 1000 MG tablet Take 2,000 mg by mouth daily with breakfast.      multivitamin-minerals-lutein (MULTIVITAMIN 50 PLUS) Tab Take 1 tablet by mouth once daily.      omega 3-dha-epa-fish oil 300-1,000 mg Cap Take 3,000 mg by mouth once daily.       Current Inpatient Medications:    Current Facility-Administered Medications:     0.9%  NaCl infusion (for blood administration), , Intravenous, Q24H PRN, Sumeet Cohn IV, MD    acetaminophen tablet 650 mg, 650 mg, Oral, Q4H PRN, Armando Dalal IV, MD, 650 mg at 04/04/23 1428    atorvastatin tablet 10 mg, 10 mg, Oral, Twice Weekly, ALEJANDRO Velasquez    carvediloL tablet 12.5 mg, 12.5 mg, Oral, BID, [facility], 12.5 mg at 04/07/23 2010    dextrose 10% bolus 125 mL 125 mL, 12.5 g, Intravenous, PRN, ALEJANDRO Velasquez    dextrose 10% bolus 250 mL 250 mL, 25 g, Intravenous, PRN, ALEJANDRO Velasquez    docusate sodium capsule 100 mg, 100 mg, Oral, BID, Armando Dalal IV, MD, 100 mg at 04/07/23 2010    enoxaparin injection 40 mg, 40 mg, Subcutaneous, Daily, Armando Dalal IV, MD, 40 mg at 04/07/23 1713    famotidine tablet 20 mg, 20 mg, Oral, BID, Armando Dalal IV, MD, 20 mg at 04/07/23 2010    glucagon (human recombinant) injection 1 mg, 1 mg, Intramuscular, PRN, ALEJANDRO Velasquez    glucose chewable tablet 16 g, 16 g, Oral, PRN, ALEJANDRO Velasquez    glucose chewable tablet 16 g, 16 g, Oral, PRN, ALEJANDRO Velasquez    glucose chewable tablet 24 g, 24 g, Oral, PRN, ALEJANDRO Velasquez    glucose chewable tablet 32 g, 32 g, Oral, PRN, ALEJANDRO Velasquez    insulin aspart U-100 injection 0-5 Units, 0-5 Units, Subcutaneous, QID (AC + HS) PRN, Juan C Holman, ANP, 3 Units at 04/07/23 2054    insulin regular injection 4 Units 0.04 mL, 4 Units, Intravenous, Once, Jian Avelar Jr., MD    loperamide  capsule 4 mg, 4 mg, Oral, Once, Armando Dalal IV, MD    magnesium oxide tablet 400 mg, 400 mg, Oral, Daily, Juan C Holman, ANP, 400 mg at 04/07/23 0922    melatonin tablet 6 mg, 6 mg, Oral, Nightly PRN, Armando Dalal IV, MD    metoclopramide HCl injection 5 mg, 5 mg, Intravenous, Q6H PRN, Armando Dalal IV, MD    ondansetron disintegrating tablet 8 mg, 8 mg, Oral, Q8H PRN, Armando Dalal IV, MD    ondansetron injection 4 mg, 4 mg, Intravenous, Q8H PRN, Sumeet Cohn IV, MD    oxyCODONE immediate release tablet 5 mg, 5 mg, Oral, Q4H PRN, Armando Dalal IV, MD, 5 mg at 04/07/23 0655    sacubitriL-valsartan 49-51 mg per tablet 1 tablet, 1 tablet, Oral, BID, Ricki Colon, [facility], 1 tablet at 04/07/23 2010    sodium chloride 0.9% flush 10 mL, 10 mL, Intravenous, PRN, Sumeet Cohn IV, MD    Facility-Administered Medications Ordered in Other Encounters:     ceFAZolin injection 2 g, 2 g, Intravenous, On Call Procedure, Demario Childress MD, 2 g at 03/23/23 1417    sodium chloride 0.9% flush 10 mL, 10 mL, Intravenous, PRN, Demario Childress MD    VTE Risk Mitigation (From admission, onward)           Ordered     enoxaparin injection 40 mg  Daily         04/03/23 1816     IP VTE HIGH RISK PATIENT  Once         04/03/23 1816     Place sequential compression device  Until discontinued         04/03/23 1816     Place sequential compression device  Until discontinued         04/03/23 1451                  Assessment:     See below MDM formulated by me (Tab Turner MD):    L Sided Hemothorax/L VATS and CT placement    CTS is ok with discharge today (small pneumo per verbal report)  Anemia (Acute Blood Loss)    H/H stable  Hypotension s/p Nitro Sprays  NICMO/EF 30%    - s/p BiV CRT-D Implant (3.23.23) - [facility]  Normal Coronaries  Hx of HTN  DM II  Hx of LBBB  Bilateral DONALD/Mild  Vertigo    Plan:   DC home today  Cont Coreg and Entresto  F/U with Dr. Snyder in 7-10 days  F/U with Dr. Childress in 1-2  weeks    DISCHARGE PLAN:  Discharge: Home  Discharge Diet: Cardiac, Low Sodium  Discharge Condition: Stable  Discharge Activity:  As Tolerated  Discharge Time: 36 minutes    Discharge Medications:     Medication List        CONTINUE taking these medications      alendronate 70 MG tablet  Commonly known as: FOSAMAX     ascorbic acid (vitamin C) 500 mg Chew     aspirin 81 MG EC tablet  Commonly known as: ECOTRIN     atorvastatin 10 MG tablet  Commonly known as: LIPITOR     BAQSIMI 3 mg/actuation Spry  Generic drug: glucagon     carvediloL 25 MG tablet  Commonly known as: COREG     cholecalciferol (vitamin D3) 25 mcg (1,000 unit) capsule  Commonly known as: VITAMIN D3     ENTRESTO 49-51 mg per tablet  Generic drug: sacubitriL-valsartan     glipiZIDE 10 MG Tr24  Commonly known as: GLUCOTROL     LANTUS SOLOSTAR U-100 INSULIN glargine 100 units/mL SubQ pen  Generic drug: insulin     magnesium oxide 400 mg (241.3 mg magnesium) tablet  Commonly known as: MAG-OX     metFORMIN 1000 MG tablet  Commonly known as: GLUCOPHAGE     MULTIVITAMIN 50 PLUS Tab  Generic drug: multivitamin-minerals-lutein     omega 3-dha-epa-fish oil 300-1,000 mg Cap     TRULICITY 0.75 mg/0.5 mL pen injector  Generic drug: dulaglutide                JULIO CESAR Amaya-BC  and Tab Turner MD  Cardiology  [facility]  04/08/2023

## 2023-04-09 PROCEDURE — G0180 MD CERTIFICATION HHA PATIENT: HCPCS | Mod: ,,, | Performed by: SPECIALIST

## 2023-04-09 PROCEDURE — G0180 PR HOME HEALTH MD CERTIFICATION: ICD-10-PCS | Mod: ,,, | Performed by: SPECIALIST

## 2023-04-10 ENCOUNTER — PATIENT OUTREACH (OUTPATIENT)
Dept: ADMINISTRATIVE | Facility: CLINIC | Age: 73
End: 2023-04-10
Payer: MEDICARE

## 2023-04-11 NOTE — PROGRESS NOTES
C3 nurse attempted to contact Michelle Rodríguez for a TCC post hospital discharge follow up call. The patient is unable to conduct the call @ this time. The patient requested a callback.    The patient does not have a scheduled HOSFU appointment within 5-7 days post hospital discharge date 4/8/2023.

## 2023-04-11 NOTE — PROGRESS NOTES
C3 nurse spoke with Michelle Rodríguez  for a TCC post hospital discharge follow up call. The patient has a scheduled HOS appointment with MIKE Mendoza  on 4/20/23 @ 1:45.

## 2023-04-14 LAB
GLUCOSE SERPL-MCNC: 221 MG/DL (ref 70–110)
HCO3 UR-SCNC: 26.6 MMOL/L (ref 24–28)
HCT VFR BLD CALC: 22 %PCV (ref 36–54)
HGB BLD-MCNC: 8 G/DL
PCO2 BLDA: 47.1 MMHG (ref 35–45)
PH SMN: 7.36 [PH] (ref 7.35–7.45)
PO2 BLDA: 429 MMHG (ref 80–100)
POC BE: 1 MMOL/L
POC IONIZED CALCIUM: 1.4 MMOL/L (ref 1.06–1.42)
POC SATURATED O2: 100 % (ref 95–100)
POC TCO2: 28 MMOL/L (ref 23–27)
POTASSIUM BLD-SCNC: 4.4 MMOL/L (ref 3.5–5.1)
SAMPLE: ABNORMAL
SODIUM BLD-SCNC: 137 MMOL/L (ref 136–145)

## 2023-04-17 ENCOUNTER — HOSPITAL ENCOUNTER (OUTPATIENT)
Dept: RADIOLOGY | Facility: HOSPITAL | Age: 73
Discharge: HOME | End: 2023-04-17
Attending: INTERNAL MEDICINE
Payer: MEDICARE

## 2023-04-17 DIAGNOSIS — Z95.810 AUTOMATIC IMPLANTABLE CARDIAC DEFIBRILLATOR IN SITU: ICD-10-CM

## 2023-04-17 PROCEDURE — 71046 X-RAY EXAM CHEST 2 VIEWS: CPT | Mod: TC

## 2023-04-26 LAB
POCT GLUCOSE: 262 MG/DL (ref 70–110)
POCT GLUCOSE: 304 MG/DL (ref 70–110)
POCT GLUCOSE: 323 MG/DL (ref 70–110)
POCT GLUCOSE: 325 MG/DL (ref 70–110)
POCT GLUCOSE: 364 MG/DL (ref 70–110)
POCT GLUCOSE: 392 MG/DL (ref 70–110)
POCT GLUCOSE: 405 MG/DL (ref 70–110)
POCT GLUCOSE: 407 MG/DL (ref 70–110)
POCT GLUCOSE: 432 MG/DL (ref 70–110)

## 2023-05-08 ENCOUNTER — HOSPITAL ENCOUNTER (OUTPATIENT)
Facility: HOSPITAL | Age: 73
Discharge: HOME | End: 2023-05-08
Attending: INTERNAL MEDICINE | Admitting: INTERNAL MEDICINE
Payer: MEDICARE

## 2023-05-08 VITALS
SYSTOLIC BLOOD PRESSURE: 125 MMHG | HEART RATE: 71 BPM | HEIGHT: 62 IN | BODY MASS INDEX: 20.4 KG/M2 | WEIGHT: 110.88 LBS | DIASTOLIC BLOOD PRESSURE: 63 MMHG | OXYGEN SATURATION: 96 % | TEMPERATURE: 99 F

## 2023-05-08 DIAGNOSIS — I50.20 SYSTOLIC HEART FAILURE: ICD-10-CM

## 2023-05-08 DIAGNOSIS — I49.9 ARRHYTHMIA: ICD-10-CM

## 2023-05-08 DIAGNOSIS — I44.7 COMPLETE LEFT BUNDLE BRANCH BLOCK: ICD-10-CM

## 2023-05-08 DIAGNOSIS — I50.42 CHRONIC COMBINED SYSTOLIC AND DIASTOLIC HEART FAILURE: ICD-10-CM

## 2023-05-08 DIAGNOSIS — I42.9 PRIMARY CARDIOMYOPATHY: ICD-10-CM

## 2023-05-08 PROBLEM — T82.198A MALFUNCTION OF IMPLANTABLE DEFIBRILLATOR VENTRICULAR (ICD) LEAD: Status: ACTIVE | Noted: 2023-05-08

## 2023-05-08 LAB
MAGNESIUM SERPL-MCNC: 2 MG/DL (ref 1.6–2.6)
POCT GLUCOSE: 279 MG/DL (ref 70–110)

## 2023-05-08 PROCEDURE — 93010 ELECTROCARDIOGRAM REPORT: CPT | Mod: ,,, | Performed by: INTERNAL MEDICINE

## 2023-05-08 PROCEDURE — C1819 TISSUE LOCALIZATION-EXCISION: HCPCS | Performed by: INTERNAL MEDICINE

## 2023-05-08 PROCEDURE — 93005 ELECTROCARDIOGRAM TRACING: CPT | Mod: 59

## 2023-05-08 PROCEDURE — 83735 ASSAY OF MAGNESIUM: CPT | Performed by: INTERNAL MEDICINE

## 2023-05-08 PROCEDURE — 25000003 PHARM REV CODE 250: Performed by: INTERNAL MEDICINE

## 2023-05-08 PROCEDURE — 99152 MOD SED SAME PHYS/QHP 5/>YRS: CPT | Performed by: INTERNAL MEDICINE

## 2023-05-08 PROCEDURE — 93010 EKG 12-LEAD: ICD-10-PCS | Mod: 77,,, | Performed by: INTERNAL MEDICINE

## 2023-05-08 PROCEDURE — 63600175 PHARM REV CODE 636 W HCPCS: Performed by: INTERNAL MEDICINE

## 2023-05-08 PROCEDURE — 99153 MOD SED SAME PHYS/QHP EA: CPT | Performed by: INTERNAL MEDICINE

## 2023-05-08 PROCEDURE — 93010 ELECTROCARDIOGRAM REPORT: CPT | Mod: 77,,, | Performed by: INTERNAL MEDICINE

## 2023-05-08 PROCEDURE — 93010 EKG 12-LEAD: ICD-10-PCS | Mod: ,,, | Performed by: INTERNAL MEDICINE

## 2023-05-08 PROCEDURE — 33215 REPOSITION PACING-DEFIB LEAD: CPT | Performed by: INTERNAL MEDICINE

## 2023-05-08 RX ORDER — ACETAMINOPHEN 325 MG/1
650 TABLET ORAL EVERY 4 HOURS PRN
Status: DISCONTINUED | OUTPATIENT
Start: 2023-05-08 | End: 2023-05-08 | Stop reason: HOSPADM

## 2023-05-08 RX ORDER — HYDROCODONE BITARTRATE AND ACETAMINOPHEN 5; 325 MG/1; MG/1
1 TABLET ORAL EVERY 4 HOURS PRN
Status: DISCONTINUED | OUTPATIENT
Start: 2023-05-08 | End: 2023-05-08 | Stop reason: HOSPADM

## 2023-05-08 RX ORDER — FENTANYL CITRATE 50 UG/ML
INJECTION, SOLUTION INTRAMUSCULAR; INTRAVENOUS
Status: DISCONTINUED | OUTPATIENT
Start: 2023-05-08 | End: 2023-05-08 | Stop reason: HOSPADM

## 2023-05-08 RX ORDER — MIDAZOLAM HYDROCHLORIDE 1 MG/ML
INJECTION INTRAMUSCULAR; INTRAVENOUS
Status: DISCONTINUED | OUTPATIENT
Start: 2023-05-08 | End: 2023-05-08 | Stop reason: HOSPADM

## 2023-05-08 RX ORDER — LIDOCAINE HYDROCHLORIDE 20 MG/ML
INJECTION, SOLUTION INFILTRATION; PERINEURAL
Status: DISCONTINUED | OUTPATIENT
Start: 2023-05-08 | End: 2023-05-08 | Stop reason: HOSPADM

## 2023-05-08 RX ORDER — VANCOMYCIN HCL IN 5 % DEXTROSE 1G/250ML
1000 PLASTIC BAG, INJECTION (ML) INTRAVENOUS
Status: DISPENSED | OUTPATIENT
Start: 2023-05-08

## 2023-05-08 RX ORDER — VANCOMYCIN HYDROCHLORIDE 1 G/20ML
INJECTION, POWDER, LYOPHILIZED, FOR SOLUTION INTRAVENOUS
Status: DISCONTINUED | OUTPATIENT
Start: 2023-05-08 | End: 2023-05-08 | Stop reason: HOSPADM

## 2023-05-08 NOTE — Clinical Note
The lead was sutured in place and was repositioned. The lead was inserted in the right ventricle.      The chronic RV lead

## 2023-05-08 NOTE — DISCHARGE SUMMARY
[facility] - Cath Lab Services  Discharge Note  Short Stay    Procedure(s) (LRB):  REVISION, INSERTION, ELECTRODE LEAD, ICD (N/A)      OUTCOME: Patient tolerated treatment/procedure well without complication and is now ready for discharge.    DISPOSITION: Home or Self Care    FINAL DIAGNOSIS:  Malfunction of implantable defibrillator ventricular (ICD) lead    FOLLOWUP: In clinic    DISCHARGE INSTRUCTIONS:  No discharge procedures on file.     TIME SPENT ON DISCHARGE: 15 minutes

## 2023-05-19 ENCOUNTER — EXTERNAL HOME HEALTH (OUTPATIENT)
Dept: HOME HEALTH SERVICES | Facility: HOSPITAL | Age: 73
End: 2023-05-19
Payer: MEDICARE

## 2024-02-12 NOTE — ANESTHESIA PREPROCEDURE EVALUATION
04/03/2023  Michelle Rodríguez is a 72 y.o., female.      Pre-op Assessment    I have reviewed the Patient Summary Reports.     I have reviewed the Nursing Notes. I have reviewed the NPO Status.   I have reviewed the Medications.     Review of Systems  Cardiovascular:   Pacemaker (AICD) Hypertension Dysrhythmias (LBBB) CHF hyperlipidemia Cardiomyopathy   Endocrine:   Diabetes, type 2, using insulin        Physical Exam  General: Well nourished    Airway:  Mallampati: II / II  Mouth Opening: Normal  TM Distance: Normal  Tongue: Normal    Dental:  Intact    Heart:  Rate: Normal        Anesthesia Plan  Type of Anesthesia, risks & benefits discussed:    Anesthesia Type: Gen ETT  Intra-op Monitoring Plan: Standard ASA Monitors and Art Line  Post Op Pain Control Plan: multimodal analgesia  Induction:  IV  Airway Plan: Direct and Video  Informed Consent: Informed consent signed with the Patient and all parties understand the risks and agree with anesthesia plan.  All questions answered. Patient consented to blood products? Yes  ASA Score: 3 Emergent    Ready For Surgery From Anesthesia Perspective.     .       Satisfactory

## 2024-05-09 ENCOUNTER — HOSPITAL ENCOUNTER (OUTPATIENT)
Facility: HOSPITAL | Age: 74
Discharge: HOME | End: 2024-05-09
Attending: INTERNAL MEDICINE | Admitting: INTERNAL MEDICINE
Payer: MEDICARE

## 2024-05-09 DIAGNOSIS — Z01.818 PREOP TESTING: ICD-10-CM

## 2024-05-09 DIAGNOSIS — I50.42 CHRONIC COMBINED SYSTOLIC AND DIASTOLIC HEART FAILURE: ICD-10-CM

## 2024-05-09 DIAGNOSIS — I49.9 ARRHYTHMIA: ICD-10-CM

## 2024-05-09 LAB
OHS QRS DURATION: 84 MS
OHS QTC CALCULATION: 402 MS
POCT GLUCOSE: 161 MG/DL (ref 70–110)

## 2024-05-09 PROCEDURE — 25500020 PHARM REV CODE 255: Performed by: INTERNAL MEDICINE

## 2024-05-09 PROCEDURE — 33241 REMOVE PULSE GENERATOR: CPT | Performed by: INTERNAL MEDICINE

## 2024-05-09 PROCEDURE — 99153 MOD SED SAME PHYS/QHP EA: CPT | Performed by: INTERNAL MEDICINE

## 2024-05-09 PROCEDURE — 93010 ELECTROCARDIOGRAM REPORT: CPT | Mod: ,,, | Performed by: INTERNAL MEDICINE

## 2024-05-09 PROCEDURE — 33244 REMOVE ELCTRD TRANSVENOUSLY: CPT | Performed by: INTERNAL MEDICINE

## 2024-05-09 PROCEDURE — C1777 LEAD, AICD, ENDO SINGLE COIL: HCPCS | Performed by: INTERNAL MEDICINE

## 2024-05-09 PROCEDURE — 63600175 PHARM REV CODE 636 W HCPCS: Performed by: INTERNAL MEDICINE

## 2024-05-09 PROCEDURE — 33249 INSJ/RPLCMT DEFIB W/LEAD(S): CPT | Performed by: INTERNAL MEDICINE

## 2024-05-09 PROCEDURE — 93005 ELECTROCARDIOGRAM TRACING: CPT

## 2024-05-09 PROCEDURE — 27201423 OPTIME MED/SURG SUP & DEVICES STERILE SUPPLY: Performed by: INTERNAL MEDICINE

## 2024-05-09 PROCEDURE — 99152 MOD SED SAME PHYS/QHP 5/>YRS: CPT | Performed by: INTERNAL MEDICINE

## 2024-05-09 PROCEDURE — C1894 INTRO/SHEATH, NON-LASER: HCPCS | Performed by: INTERNAL MEDICINE

## 2024-05-09 PROCEDURE — 27800903 OPTIME MED/SURG SUP & DEVICES OTHER IMPLANTS: Performed by: INTERNAL MEDICINE

## 2024-05-09 PROCEDURE — 25000003 PHARM REV CODE 250: Performed by: INTERNAL MEDICINE

## 2024-05-09 DEVICE — ENVELOPE CMRM6133 ABSORB LRG MR
Type: IMPLANTABLE DEVICE | Site: HEART | Status: FUNCTIONAL
Brand: TYRX™

## 2024-05-09 DEVICE — DEFIBRILLATION LEAD
Type: IMPLANTABLE DEVICE | Site: HEART | Status: FUNCTIONAL
Brand: DURATA™

## 2024-05-09 RX ORDER — MIDAZOLAM HYDROCHLORIDE 1 MG/ML
INJECTION INTRAMUSCULAR; INTRAVENOUS
Status: DISCONTINUED | OUTPATIENT
Start: 2024-05-09 | End: 2024-05-09 | Stop reason: HOSPADM

## 2024-05-09 RX ORDER — FENTANYL CITRATE 50 UG/ML
INJECTION, SOLUTION INTRAMUSCULAR; INTRAVENOUS
Status: DISCONTINUED | OUTPATIENT
Start: 2024-05-09 | End: 2024-05-09 | Stop reason: HOSPADM

## 2024-05-09 RX ORDER — SODIUM CHLORIDE 0.9 % (FLUSH) 0.9 %
10 SYRINGE (ML) INJECTION
Status: DISCONTINUED | OUTPATIENT
Start: 2024-05-09 | End: 2024-05-09 | Stop reason: HOSPADM

## 2024-05-09 RX ORDER — VANCOMYCIN HYDROCHLORIDE 1 G/20ML
1000 INJECTION, POWDER, LYOPHILIZED, FOR SOLUTION INTRAVENOUS
Status: COMPLETED | OUTPATIENT
Start: 2024-05-09 | End: 2024-05-09

## 2024-05-09 RX ORDER — HYDROCODONE BITARTRATE AND ACETAMINOPHEN 5; 325 MG/1; MG/1
1 TABLET ORAL EVERY 4 HOURS PRN
Status: DISCONTINUED | OUTPATIENT
Start: 2024-05-09 | End: 2024-05-09 | Stop reason: HOSPADM

## 2024-05-09 RX ORDER — ACETAMINOPHEN 325 MG/1
650 TABLET ORAL EVERY 4 HOURS PRN
Status: DISCONTINUED | OUTPATIENT
Start: 2024-05-09 | End: 2024-05-09 | Stop reason: HOSPADM

## 2024-05-09 RX ORDER — SODIUM CHLORIDE 9 MG/ML
INJECTION, SOLUTION INTRAVENOUS ONCE
Status: COMPLETED | OUTPATIENT
Start: 2024-05-09 | End: 2024-05-09

## 2024-05-09 RX ORDER — LIDOCAINE HYDROCHLORIDE 10 MG/ML
INJECTION INFILTRATION; PERINEURAL
Status: DISCONTINUED | OUTPATIENT
Start: 2024-05-09 | End: 2024-05-09 | Stop reason: HOSPADM

## 2024-05-09 RX ADMIN — SODIUM CHLORIDE: 9 INJECTION, SOLUTION INTRAVENOUS at 06:05

## 2024-05-09 RX ADMIN — HYDROCODONE BITARTRATE AND ACETAMINOPHEN 1 TABLET: 5; 325 TABLET ORAL at 10:05

## 2024-05-09 NOTE — Clinical Note
A generator pocket was made, opened and revised at the left lateral text with plasma blade and sharp dissection.

## 2024-05-09 NOTE — DISCHARGE SUMMARY
[facility] - Cath Lab Services  Discharge Note  Short Stay    Procedure(s) (LRB):  Pocket revision (N/A)      OUTCOME: Patient tolerated treatment/procedure well without complication and is now ready for discharge.    DISPOSITION: Home or Self Care    FINAL DIAGNOSIS:  Pocket and RV lead revision for RV lead malfunction and pocket thinning    FOLLOWUP: In clinic    DISCHARGE INSTRUCTIONS:  No discharge procedures on file.     TIME SPENT ON DISCHARGE: 15 minutes

## 2024-05-09 NOTE — PLAN OF CARE
Bed rest completed. VSS. XR neg. Site WDL. Dermabond clean, dry, and intact; no drainage. Sling applied. Discharge instructions and paper prescriptions given to patient and family. All verbalize understanding. IV removed and patient transported via wheelchair to 's vehicle. Patient in no acute distress upon discharge.

## 2024-05-09 NOTE — Clinical Note
The lead was inserted under fluorscopic guidance, thresholds were tested, was connected to generator, was sutured in place and was secured in place. The lead was inserted in the right ventricle.      The right ventricular lead was then secured.

## 2024-05-09 NOTE — Clinical Note
The sheath was inserted into the left subclavian vein. SHEATH FROM REP. OLD RV LEAD EXPLANTED. NEW LEAD INSERTED

## 2024-05-09 NOTE — DISCHARGE INSTRUCTIONS
You received new leads today. Keep your elbow below shoulder and do not lift more than 10 pounds for 4 weeks to prevent dislodgement   Keep incision dry for 3 days: On Sunday, 5/12/24, you may wash incision gently with soap and water only. DO NOT SCRUB.    Do not apply anything to incision: no Band-Aids, ointments, creams, etc.   The glue that was applied to incision (Dermabond) will flake off over time- do not pick at it.   The incision should be closed with no drainage or gaping edges. If it opens, call the office- [phone]  Monitor for any signs of infection: fever, redness, discharge, swelling, or redness. Call the office or report to ER for any signs of infection.   You may take Tylenol or the medication prescribed for pain.   Complete entire 5 days of antibiotics.     You will wear the sling for 24 hours and at bedtime for 4 weeks.

## 2024-05-10 LAB
OHS QRS DURATION: 112 MS
OHS QTC CALCULATION: 474 MS

## 2024-05-13 VITALS
RESPIRATION RATE: 16 BRPM | WEIGHT: 112.88 LBS | TEMPERATURE: 98 F | SYSTOLIC BLOOD PRESSURE: 145 MMHG | BODY MASS INDEX: 20.77 KG/M2 | DIASTOLIC BLOOD PRESSURE: 68 MMHG | HEIGHT: 62 IN | HEART RATE: 71 BPM | OXYGEN SATURATION: 98 %

## (undated) DEVICE — SUT SILK 0 SH 30IN BLK BR

## (undated) DEVICE — TUBING HP AIRLSS ROT ADPT 30IN

## (undated) DEVICE — TROCAR ENDOPATH XCEL 11MM 10CM

## (undated) DEVICE — COVER PROBE US 5.5X58L NON LTX

## (undated) DEVICE — ELECTRODE REM PLYHSV RETURN 9

## (undated) DEVICE — KIT ANTIFOG W/SPONG & FLUID

## (undated) DEVICE — CATH JACKY RADIAL 5FR 100CM

## (undated) DEVICE — SHEATH CPS DIRECT 115D 47CM

## (undated) DEVICE — GUIDEWIRE TORQUE 3CM/180CML

## (undated) DEVICE — CATH OPTITORQUE RADIAL 5FR

## (undated) DEVICE — GUIDEWIRE EMERALD .035IN 260CM

## (undated) DEVICE — SPONGE LAP STRL 18X18IN

## (undated) DEVICE — PAD DEFIB CADENCE ADULT R2

## (undated) DEVICE — SUT 2-0 VICRYL / CT-1

## (undated) DEVICE — SUT CTD VICRYL PLUS 4/0

## (undated) DEVICE — DRAPE ANGIO BRACH 38X44IN

## (undated) DEVICE — ILLUMINATOR PHOTONBLADE 8/11IN

## (undated) DEVICE — DRAPE INCISE IOBAN 2 23X17IN

## (undated) DEVICE — SHEATH INTRODUCER 5FR 10CM

## (undated) DEVICE — DRESSING GZ ISLAND ADH 6X6IN

## (undated) DEVICE — SUT VICRYL 3-0 8-18 PS-1

## (undated) DEVICE — GLOVE PROTEXIS LTX MICRO 8

## (undated) DEVICE — TUBING INSUFFLATOR W/ROT CONCT

## (undated) DEVICE — ADHESIVE DERMABOND ADVANCED

## (undated) DEVICE — ELECTRODE BLD EXT 6.50 ST DISP

## (undated) DEVICE — GLIDE CATH ANGLED 4FR 65CM

## (undated) DEVICE — CATH EMPULSE ANGLED 5FR PIGTAI

## (undated) DEVICE — SUT ETHBND XTRA 1 OS-8 30IN

## (undated) DEVICE — Device

## (undated) DEVICE — SOL IRRI STRL WATER 1000ML

## (undated) DEVICE — CLOSURE SKIN STERI STRIP 1/2X4

## (undated) DEVICE — INTRODUCER TRNSRADIAL 6F 10CM

## (undated) DEVICE — CATH FL 3.5 5FR

## (undated) DEVICE — PACK PACEMAKER

## (undated) DEVICE — CUSHION  WC FOAM 20X20X.75IN

## (undated) DEVICE — TRAY CATH FOL SIL URIMTR 16FR

## (undated) DEVICE — SUT VICRYL 2-0 36 CT-1

## (undated) DEVICE — DRAPE STERI INCISE 23X23IN

## (undated) DEVICE — SET ANGIO ACIST CVI ANGIOTOUCH

## (undated) DEVICE — TRAP MUCUS SPECIMEN 80CC

## (undated) DEVICE — CANNULA ADULT NASAL 7FT

## (undated) DEVICE — KIT SURGICAL TURNOVER

## (undated) DEVICE — PAD HEARTSTART DEFIB ADULT

## (undated) DEVICE — CATH THOR SIL STR 6 EYELT 28FR

## (undated) DEVICE — CANNULA NASAL ADULT

## (undated) DEVICE — GAUZE SPONGE XRAY 4X4

## (undated) DEVICE — SUT VICRYL 2-0 27 CT-1

## (undated) DEVICE — DRAPE SLUSH WARMER 66X44IN

## (undated) DEVICE — CONTRAST ISOVUE 300 50ML

## (undated) DEVICE — ELECTRODE PATIENT RETURN DISP

## (undated) DEVICE — TUBE SUCTION MEDI-VAC STERILE

## (undated) DEVICE — GLOVE PROTEXIS HYDROGEL SZ6.5

## (undated) DEVICE — GLOVE PROTEXIS BLUE LATEX 7

## (undated) DEVICE — DRAIN CHEST DRY SUCTION

## (undated) DEVICE — IRRIGATOR SUCTION W/TIP

## (undated) DEVICE — GUIDEWIRD CPS COURIER FIRM

## (undated) DEVICE — TROCAR ENDOPATH XCEL 5X100MM

## (undated) DEVICE — SUT VICRYL CTD 2-0 GI 27 SH

## (undated) DEVICE — COVER LIGHT HANDLE CHROMOPHARE

## (undated) DEVICE — DRAPE INCISE IOBAN 2 23X23IN

## (undated) DEVICE — BAND TR COMP DEVICE REG 24CM

## (undated) DEVICE — PAD DEFIB RADIOLUCENT ADULT

## (undated) DEVICE — SYR MEDALLION RED 10ML

## (undated) DEVICE — BOWL UTILITY BLUE 32OZ

## (undated) DEVICE — APPLICATOR CHLORAPREP ORN 26ML

## (undated) DEVICE — GLOVE PROTEXIS PI SYN SURG 7.5

## (undated) DEVICE — PACK PACER PERMANENT

## (undated) DEVICE — KIT WRENCH

## (undated) DEVICE — GLOVE SURG BIOGEL LATEX SZ 7.5